# Patient Record
Sex: MALE | Race: WHITE
[De-identification: names, ages, dates, MRNs, and addresses within clinical notes are randomized per-mention and may not be internally consistent; named-entity substitution may affect disease eponyms.]

---

## 2019-01-29 ENCOUNTER — HOSPITAL ENCOUNTER (INPATIENT)
Dept: HOSPITAL 1 - ED | Age: 54
LOS: 2 days | Discharge: HOME HEALTH | DRG: 137 | End: 2019-01-31
Attending: INTERNAL MEDICINE | Admitting: INTERNAL MEDICINE
Payer: COMMERCIAL

## 2019-01-29 VITALS — SYSTOLIC BLOOD PRESSURE: 172 MMHG | DIASTOLIC BLOOD PRESSURE: 86 MMHG

## 2019-01-29 VITALS — SYSTOLIC BLOOD PRESSURE: 160 MMHG | DIASTOLIC BLOOD PRESSURE: 93 MMHG

## 2019-01-29 VITALS
BODY MASS INDEX: 26.55 KG/M2 | WEIGHT: 169.13 LBS | HEIGHT: 67 IN | WEIGHT: 169.13 LBS | BODY MASS INDEX: 26.55 KG/M2 | HEIGHT: 67 IN

## 2019-01-29 VITALS — DIASTOLIC BLOOD PRESSURE: 86 MMHG | SYSTOLIC BLOOD PRESSURE: 166 MMHG

## 2019-01-29 VITALS — SYSTOLIC BLOOD PRESSURE: 168 MMHG | DIASTOLIC BLOOD PRESSURE: 66 MMHG

## 2019-01-29 DIAGNOSIS — Z93.1: ICD-10-CM

## 2019-01-29 DIAGNOSIS — J96.01: ICD-10-CM

## 2019-01-29 DIAGNOSIS — I69.354: ICD-10-CM

## 2019-01-29 DIAGNOSIS — Z74.01: ICD-10-CM

## 2019-01-29 DIAGNOSIS — Z66: ICD-10-CM

## 2019-01-29 DIAGNOSIS — J45.909: ICD-10-CM

## 2019-01-29 DIAGNOSIS — N18.3: ICD-10-CM

## 2019-01-29 DIAGNOSIS — D64.9: ICD-10-CM

## 2019-01-29 DIAGNOSIS — Z79.84: ICD-10-CM

## 2019-01-29 DIAGNOSIS — Z83.3: ICD-10-CM

## 2019-01-29 DIAGNOSIS — Z23: ICD-10-CM

## 2019-01-29 DIAGNOSIS — E11.22: ICD-10-CM

## 2019-01-29 DIAGNOSIS — I12.9: ICD-10-CM

## 2019-01-29 DIAGNOSIS — Z90.49: ICD-10-CM

## 2019-01-29 DIAGNOSIS — E78.00: ICD-10-CM

## 2019-01-29 DIAGNOSIS — J69.0: Primary | ICD-10-CM

## 2019-01-29 LAB
ALBUMIN SERPL-MCNC: 2 G/DL (ref 3.4–5)
ALP SERPL-CCNC: 200 U/L (ref 46–116)
ALT SERPL-CCNC: 16 U/L (ref 16–63)
AMYLASE SERPL-CCNC: 57 U/L (ref 25–115)
AST SERPL-CCNC: 20 U/L (ref 15–37)
BASOPHILS NFR BLD: 0.4 % (ref 0–2)
BILIRUB SERPL-MCNC: 0.1 MG/DL (ref 0.2–1)
BUN SERPL-MCNC: 28 MG/DL (ref 7–18)
CALCIUM SERPL-MCNC: 8.4 MG/DL (ref 8.5–10.1)
CHLORIDE SERPL-SCNC: 108 MMOL/L (ref 98–107)
CHOLEST SERPL-MCNC: 251 MG/DL (ref ?–200)
CO2 SERPL-SCNC: 30.6 MMOL/L (ref 21–32)
CREAT SERPL-MCNC: 1.7 MG/DL (ref 0.7–1.3)
ERYTHROCYTE [DISTWIDTH] IN BLOOD BY AUTOMATED COUNT: 13 % (ref 11.5–14.5)
GFR SERPLBLD BASED ON 1.73 SQ M-ARVRAT: 45 ML/MIN
GLUCOSE SERPL-MCNC: 82 MG/DL (ref 74–106)
HDLC SERPL-MCNC: 60 MG/DL (ref 40–60)
LIPASE SERPL-CCNC: 58 IU/L (ref 73–393)
MICROSCOPIC UR-IMP: YES
PLATELET # BLD: 279 X10^3MCL (ref 130–400)
POTASSIUM SERPL-SCNC: 4.2 MMOL/L (ref 3.5–5.1)
PROT SERPL-MCNC: 6 G/DL (ref 6.4–8.2)
RBC # UR STRIP.AUTO: (no result) /UL
SODIUM SERPL-SCNC: 141 MMOL/L (ref 136–145)
T4 SERPL-MCNC: 4.7 UG/DL (ref 4.7–13.3)
UA SPECIFIC GRAVITY: 1.02 (ref 1–1.03)

## 2019-01-30 VITALS — DIASTOLIC BLOOD PRESSURE: 82 MMHG | SYSTOLIC BLOOD PRESSURE: 130 MMHG

## 2019-01-30 VITALS — DIASTOLIC BLOOD PRESSURE: 83 MMHG | SYSTOLIC BLOOD PRESSURE: 145 MMHG

## 2019-01-30 VITALS — DIASTOLIC BLOOD PRESSURE: 81 MMHG | SYSTOLIC BLOOD PRESSURE: 145 MMHG

## 2019-01-30 VITALS — DIASTOLIC BLOOD PRESSURE: 70 MMHG | SYSTOLIC BLOOD PRESSURE: 150 MMHG

## 2019-01-30 VITALS — SYSTOLIC BLOOD PRESSURE: 144 MMHG | DIASTOLIC BLOOD PRESSURE: 90 MMHG

## 2019-01-30 VITALS — DIASTOLIC BLOOD PRESSURE: 87 MMHG | SYSTOLIC BLOOD PRESSURE: 148 MMHG

## 2019-01-30 LAB
BASOPHILS NFR BLD: 0.5 % (ref 0–2)
BUN SERPL-MCNC: 25 MG/DL (ref 7–18)
CALCIUM SERPL-MCNC: 7.9 MG/DL (ref 8.5–10.1)
CHLORIDE SERPL-SCNC: 109 MMOL/L (ref 98–107)
CO2 SERPL-SCNC: 26.9 MMOL/L (ref 21–32)
CREAT SERPL-MCNC: 1.7 MG/DL (ref 0.7–1.3)
ERYTHROCYTE [DISTWIDTH] IN BLOOD BY AUTOMATED COUNT: 13.8 % (ref 11.5–14.5)
GFR SERPLBLD BASED ON 1.73 SQ M-ARVRAT: 45 ML/MIN
GLUCOSE SERPL-MCNC: 72 MG/DL (ref 74–106)
PLATELET # BLD: 230 X10^3MCL (ref 130–400)
POTASSIUM SERPL-SCNC: 3.9 MMOL/L (ref 3.5–5.1)
SODIUM SERPL-SCNC: 141 MMOL/L (ref 136–145)

## 2019-01-31 VITALS — DIASTOLIC BLOOD PRESSURE: 89 MMHG | SYSTOLIC BLOOD PRESSURE: 147 MMHG

## 2019-01-31 VITALS — DIASTOLIC BLOOD PRESSURE: 82 MMHG | SYSTOLIC BLOOD PRESSURE: 154 MMHG

## 2019-01-31 VITALS — SYSTOLIC BLOOD PRESSURE: 148 MMHG | DIASTOLIC BLOOD PRESSURE: 81 MMHG

## 2019-01-31 VITALS — DIASTOLIC BLOOD PRESSURE: 94 MMHG | SYSTOLIC BLOOD PRESSURE: 150 MMHG

## 2019-07-30 ENCOUNTER — HOSPITAL ENCOUNTER (OUTPATIENT)
Dept: HOSPITAL 1 - DS | Age: 54
Discharge: HOME | End: 2019-07-30
Attending: INTERNAL MEDICINE
Payer: COMMERCIAL

## 2019-07-30 VITALS — SYSTOLIC BLOOD PRESSURE: 171 MMHG | DIASTOLIC BLOOD PRESSURE: 103 MMHG

## 2019-07-30 VITALS — WEIGHT: 149.98 LBS | BODY MASS INDEX: 24.99 KG/M2 | HEIGHT: 65 IN

## 2019-07-30 VITALS — SYSTOLIC BLOOD PRESSURE: 185 MMHG | DIASTOLIC BLOOD PRESSURE: 106 MMHG

## 2019-07-30 DIAGNOSIS — F01.50: ICD-10-CM

## 2019-07-30 DIAGNOSIS — Z90.49: ICD-10-CM

## 2019-07-30 DIAGNOSIS — Z99.3: ICD-10-CM

## 2019-07-30 DIAGNOSIS — I10: ICD-10-CM

## 2019-07-30 DIAGNOSIS — I69.354: ICD-10-CM

## 2019-07-30 DIAGNOSIS — R13.10: ICD-10-CM

## 2019-07-30 DIAGNOSIS — E11.9: ICD-10-CM

## 2019-07-30 DIAGNOSIS — I69.391: ICD-10-CM

## 2019-07-30 DIAGNOSIS — K94.23: Primary | ICD-10-CM

## 2019-07-30 PROCEDURE — 0D20XUZ CHANGE FEEDING DEVICE IN UPPER INTESTINAL TRACT, EXTERNAL APPROACH: ICD-10-PCS | Performed by: INTERNAL MEDICINE

## 2020-07-11 ENCOUNTER — HOSPITAL ENCOUNTER (INPATIENT)
Dept: HOSPITAL 1 - ED | Age: 55
LOS: 10 days | DRG: 853 | End: 2020-07-21
Attending: FAMILY MEDICINE | Admitting: FAMILY MEDICINE
Payer: COMMERCIAL

## 2020-07-11 VITALS
WEIGHT: 132.28 LBS | HEIGHT: 67 IN | HEIGHT: 67 IN | BODY MASS INDEX: 20.76 KG/M2 | WEIGHT: 132.28 LBS | BODY MASS INDEX: 20.76 KG/M2

## 2020-07-11 DIAGNOSIS — N39.0: ICD-10-CM

## 2020-07-11 DIAGNOSIS — I46.9: ICD-10-CM

## 2020-07-11 DIAGNOSIS — L89.223: ICD-10-CM

## 2020-07-11 DIAGNOSIS — Z90.49: ICD-10-CM

## 2020-07-11 DIAGNOSIS — Z74.01: ICD-10-CM

## 2020-07-11 DIAGNOSIS — Z79.899: ICD-10-CM

## 2020-07-11 DIAGNOSIS — N18.6: ICD-10-CM

## 2020-07-11 DIAGNOSIS — L89.154: ICD-10-CM

## 2020-07-11 DIAGNOSIS — A41.9: Primary | ICD-10-CM

## 2020-07-11 DIAGNOSIS — J12.89: ICD-10-CM

## 2020-07-11 DIAGNOSIS — Z99.2: ICD-10-CM

## 2020-07-11 DIAGNOSIS — J96.01: ICD-10-CM

## 2020-07-11 DIAGNOSIS — E11.65: ICD-10-CM

## 2020-07-11 DIAGNOSIS — E87.6: ICD-10-CM

## 2020-07-11 DIAGNOSIS — D63.1: ICD-10-CM

## 2020-07-11 DIAGNOSIS — Z86.73: ICD-10-CM

## 2020-07-11 DIAGNOSIS — U07.1: ICD-10-CM

## 2020-07-11 DIAGNOSIS — G81.90: ICD-10-CM

## 2020-07-11 DIAGNOSIS — E87.1: ICD-10-CM

## 2020-07-11 DIAGNOSIS — E43: ICD-10-CM

## 2020-07-11 DIAGNOSIS — Z83.3: ICD-10-CM

## 2020-07-11 DIAGNOSIS — R13.10: ICD-10-CM

## 2020-07-11 DIAGNOSIS — E11.22: ICD-10-CM

## 2020-07-11 LAB
ALBUMIN SERPL-MCNC: 1.1 G/DL (ref 3.4–5)
ALP SERPL-CCNC: 505 U/L (ref 46–116)
ALT SERPL-CCNC: 19 U/L (ref 16–63)
AST SERPL-CCNC: 30 U/L (ref 15–37)
BILIRUB SERPL-MCNC: 0.4 MG/DL (ref 0.2–1)
BUN SERPL-MCNC: 49 MG/DL (ref 7–18)
CALCIUM SERPL-MCNC: 7.6 MG/DL (ref 8.5–10.1)
CHLORIDE SERPL-SCNC: 93 MMOL/L (ref 98–107)
CO2 SERPL-SCNC: 25.1 MMOL/L (ref 21–32)
CREAT SERPL-MCNC: 2.1 MG/DL (ref 0.7–1.3)
ERYTHROCYTE [DISTWIDTH] IN BLOOD BY AUTOMATED COUNT: 14.9 % (ref 11.5–14.5)
GFR SERPLBLD BASED ON 1.73 SQ M-ARVRAT: 35 ML/MIN
GLUCOSE SERPL-MCNC: 180 MG/DL (ref 74–106)
MICROSCOPIC UR-IMP: YES
MONOCYTES NFR BLD: 2 % (ref 0–7)
NEUTS BAND NFR BLD: 15 % (ref 0–10)
NEUTS SEG NFR BLD MANUAL: 81 % (ref 37–75)
PLAT MORPH BLD: (no result)
PLATELET # BLD: 301 X10^3MCL (ref 130–400)
POTASSIUM SERPL-SCNC: 3.3 MMOL/L (ref 3.5–5.1)
PROT SERPL-MCNC: 5.5 G/DL (ref 6.4–8.2)
RBC # UR STRIP.AUTO: (no result) /UL
RBC MORPH BLD: (no result)
SODIUM SERPL-SCNC: 127 MMOL/L (ref 136–145)
UA SPECIFIC GRAVITY: 1.01 (ref 1–1.03)

## 2020-07-11 PROCEDURE — P9016 RBC LEUKOCYTES REDUCED: HCPCS

## 2020-07-11 PROCEDURE — C9113 INJ PANTOPRAZOLE SODIUM, VIA: HCPCS

## 2020-07-11 PROCEDURE — G0378 HOSPITAL OBSERVATION PER HR: HCPCS

## 2020-07-11 NOTE — NUR
TURNED AND REPOSITIONED PT TO LEFT SIDE. PRESSURE REDISTRIBUTED PILLOW TO
SCARAL, B/L ELBOWS AND B/L HEALS. PT LOOKS TO BE IN NO ACUTE DISTRESS AT THIS
TIME. WILL CONTINUE TO MONITOR.

## 2020-07-11 NOTE — NUR
PT REPOSITIONED FOR COMFORT AND TO PREVENT PRESSURE ULCERS. PT MOVED TO LEFT
SIDE. PILLOWS PLACED UNDER FELECIA PROMINENCES. PT TOLERATING BIPAP WELL. CHRISTIANO
CHEST RISE AND FALL NOTED. COMFORT MEASURES IN PLACE. WILL CONTINUE TO MONITOR

## 2020-07-11 NOTE — NUR
PT LAYING ON RT SIDE IN ED GURNEY THIS TIME. PT HAS PILLOWS UNDER BONY
PROMINENCES. PT IS ABLE TO OPEN HIS EYES BUT DOES NOT SPEAK OR FOLLOW COMMANDS.
PT IS ON BIPAP AT THIS TIME. PT APPEARS TO BE TOLERATING BIPAP WELL, NO
GRIMACING NOTED. CHRISTIANO CHEST RISE AND FALL NOTED. NO MEDICATIONS ARE RUNNING AT
THIS TIME, AWAITING ADMITTING ORDERS.

## 2020-07-11 NOTE — NUR
PT REPOSITIONED FOR COMFORT. PT MAKES MOANING NOISES WHILE REPOSITIONING. PT IS
ALERT BUT DOES NOT RESPOND OR FOLLOW COMMANDS. PT TOLERATING BIPAP WELL. RESPS
ARE E/U. CHRISTIANO CHEST RISE AND NOTED. WILL CONTINUE TO MONITOR

## 2020-07-11 NOTE — NUR
PT NOTED RESTING ON RIGHT SIDE IN ER NEELAM PAZ AT THIS TIME, NO AD NOTED.
SIDE RAILS UP X 2 FOR SAFETY. WILL CONTINUE TO MONITOR.

## 2020-07-11 NOTE — NUR
PER [facility], IT WAS REPORTED THAT PT WAS SWABBED FOR COVID AND HAS A
NEGATIVE RESULT, DATE AND TIME OF SWAB UNKNOWN, STATES SOMETIME LAST WEEK,

## 2020-07-11 NOTE — NUR
CENTRAL LINE SUCCESSFUL, 7 FR, 3 LUMEN, 20 CM CATHETER, TO THE LEFT SUBCLAVIAN,
VERBAL ORDER FOR X RAY, CK SAMUEL CONTACTED X RAY STAT,

## 2020-07-11 NOTE — NUR
DR LAWLER AT BEDSIDE WITH ULTRASOUND GUIDED IV INSERTION, UNSUCCESSFUL,
REQUESTING FOR CENTRAL LINE SET UP, ORDERS CARRIED OUT

## 2020-07-11 NOTE — NUR
TURNED AND REPOSITIONED PT WITH PILLOW. TURNED PT TO LEFT SIDE WITH PILLOW. B/
HEALS AND ELBOWS REDISTRIBUTED WITH PILLOW. MADE PT COMFORTABLE IN BED. PT
LOOKS TO BE IN NO ACUTE DISTRESS AT THIS TIME. WILL CONTINUE TO MONITOR.

## 2020-07-11 NOTE — NUR
WIFE AT BEDSIDE, WIFE SIGNED POLST FORM. WIFE STATED PT WAS DOING GOOD LAST
NIGHT THEN AFTER 0700 TODAY STARTED LABOR BREATHING AND O2 WENT DOWN TO 35%.
WIFE STATES PT HAS BEEN VERY FLEMMY AND COUGHING OVER PAST 4 DAYS. 
AT BASELINE PT IS UNABLE TO SPEAK D/T PAST CVA AND IS NORMALLY UNABLE TO FOLLOW
COMMANDS. PER WIFE, PT HAS A FISTULA TO RIGHT FOREARM THAT WAS PUT IN IN MARCH
BUT RIGHT SUBCLAVIAN IS WHAT IS CURRENLTY BEING USED FOR DIALYSIS. LAST
DIALYSIS WAS YESTERDAY. WILL CONTINUE TO MONITOR.

## 2020-07-11 NOTE — NUR
PT BIBA/FIRE C/O SOB, PER [facility] FAMILY CONTACTED [facility], PT WITH RESPIRATORY
DISTRESS, PER FAMILY PT WAS BREATHING FAST, ACCORDING TO [facility], PT WAS ADMITTED
TO [facility] 8 DAYS AGO WITH A DIAGNOSES OF PNEUMONIA, RALES NOTED UPON
AUSCULATION, RR 35 ON SCENE WITH A BS OF 46, PT 02 SAT AT 35%-42% RA, PLACED ON
HIGH OXYGEN FLOW, 02 SAT INCREASED TO 94%, GLUCAGON WAS ADMINISTERED ON SCENCE,
PT HAS A HX OF [facility], DM, BED-BOUND, WIFE TO COME WITH MEDICATION LIST, PT ALSO
NOTED TO BE RECEIVING DIALYSIS, UKNOWN OF WHAT DAYS AT THIS TIME, DIALYSIS
SHUNT NOTED ON RIGHT UPPER CHEST, G TUBE NOTED ON MID ABDONINAL REGION, UPON
ARRIVAL, PT PLACED IN T1, RT AT BEDSIDE WITH BIPAP, IV ACESS UNOBTAINABLE, 
MADE AWARE, BS UPON ARRIVAL 39,  MADE AWARE, CURRENT RR 32, 02 % ON
BIPAP, SAFETY PRECAUTIONS IN PLACE, WILL MONITOR,

## 2020-07-11 NOTE — NUR
ALL WOUNDS DOCUMENTED AND PICTURES TAKEN. 
PRESSURE ULCER NOTED TO B/L HIPS AND SACRAL. SCAB NOTED TO MIDDLE BACK AND
RIGHT HEAL. OPTIFOAM APPLIED TO LEFT HIP, SACRAL AND B/L HEALS. PRESSURE
REDISTRIBUTED TO RIGHT SIDE, LEFT ARM AND B/L HEALS WITH PILLOW. DR. LAWLER
MADE AWARE OF WOUND. FOUL SMELL NOTED TO RIGHT HIP PRESSURE ULCER.

## 2020-07-11 NOTE — NUR
TURNED AND REPOSITIONED PT, PT TURNED TO LEFT SIDE. POSITIONED WITH PILLOW AND
REDISTRIBUTED WITH PILLOW UNDER B/L HEALS AND ELBOW. PT LOOKS TO BE IN NO ACUTE
DISTRESS AT THIS TIME. WILL CONTINUE TO MONITOR.

## 2020-07-11 NOTE — NUR
PER WIFE, NO CHEST COMPRESSION OR INTUBATION. WILL HAVE WIFE SIGN FORM. WIFE
OUTSIDE AT THIS TIME WAITING TO SEE PT. DR. LAWLER AWARE OF WIFE'S WISHES

## 2020-07-11 NOTE — NUR
SPOKE WITH DR KIM ABOUT PTS BP MEDICATION. INFORMED THE DR THAT THE PTS
BP IS WITHIN NORMAL LIMITS AND HAS BEEN TRENDING LOW. WE BOTH AGREED TO HOLD
THE MEDICATIONS AT THIS TIME

## 2020-07-12 VITALS — DIASTOLIC BLOOD PRESSURE: 70 MMHG | SYSTOLIC BLOOD PRESSURE: 120 MMHG

## 2020-07-12 VITALS — SYSTOLIC BLOOD PRESSURE: 117 MMHG | DIASTOLIC BLOOD PRESSURE: 69 MMHG

## 2020-07-12 VITALS — SYSTOLIC BLOOD PRESSURE: 104 MMHG | DIASTOLIC BLOOD PRESSURE: 64 MMHG

## 2020-07-12 LAB
BUN SERPL-MCNC: 59 MG/DL (ref 7–18)
CALCIUM SERPL-MCNC: 8.2 MG/DL (ref 8.5–10.1)
CHLORIDE SERPL-SCNC: 95 MMOL/L (ref 98–107)
CO2 SERPL-SCNC: 23.4 MMOL/L (ref 21–32)
CREAT SERPL-MCNC: 2.5 MG/DL (ref 0.7–1.3)
ERYTHROCYTE [DISTWIDTH] IN BLOOD BY AUTOMATED COUNT: 15.4 % (ref 11.5–14.5)
GFR SERPLBLD BASED ON 1.73 SQ M-ARVRAT: 29 ML/MIN
GLUCOSE SERPL-MCNC: 95 MG/DL (ref 74–106)
MAGNESIUM SERPL-MCNC: 2.1 MG/DL (ref 1.8–2.4)
MONOCYTES NFR BLD: 2 % (ref 0–7)
NEUTS BAND NFR BLD: 13 % (ref 0–10)
NEUTS SEG NFR BLD MANUAL: 81 % (ref 37–75)
PHOSPHATE SERPL-MCNC: 3.2 MG/DL (ref 2.5–4.9)
PLAT MORPH BLD: (no result)
PLATELET # BLD: 352 X10^3MCL (ref 130–400)
POTASSIUM SERPL-SCNC: 3.6 MMOL/L (ref 3.5–5.1)
RBC MORPH BLD: (no result)
SODIUM SERPL-SCNC: 130 MMOL/L (ref 136–145)

## 2020-07-12 NOTE — NUR
RECEIVED PT FROM ER VIA GURNEY ACCOMPANIED BY RN AND EMT. PT WAS TRANSFERRED
TO ICU BED 8 WITH NO COMPLICATIONS. PT CONNECTED TO FULL CARDIAC
MONITOR/CONTINOUS PULSE OXIMETRY, VITALS READING: NIBP 117/69 MAP 95, HR 77,
RR 14, SPO2 100%, RECTAL TEMP 94.5. PT IS NONVERBAL AT BASELINE. UNABLE TO
FOLLOW SIMPLE COMMANDS/MAKE NEEDS KNONW. OPENS EYES TO TACTILE STIMULI,
UNABLE TO TRACK. LEFT SUBCLAVIAL CVC, TLC, INTACT/SECURED, DRESSING CDI. PT'S
BREATHING IS E/U ON 15 LPM VIA NONREBREATHER. LUNGS SOUND DIMIN BILAT.
SYMMETRICAL CHEST EXPANSION NOTED. NO S/S OF RESP DISTRESS NOTED. SKIN IS COOL
AND DRY. +4 EDEMA NOTED TO RUE. PT BEDBOUND, TOTAL CARE. ABD IS SOFT/FLAT,
NONTENDER TO PALPATION. BOWEL SOUNDS ACTIVE X4 QUADRANTS. PT HAD A SMALL SOFT
BROWN BM, PERICARE PROVIDED. PEG NOTED TO LUQ. F/C INTACT/SECURED, DRAINING
VIA GRAVITY WITH BROWN CLOUDY COLORED URINE. RIGHT UPPER CHEST DIALYSIS PORT,
DRESSING CDI. PRESSURE ULCER NOTED TO SACRAL AREA, RIGHT HIP, AND LEFT HIP,
OPTIFOAM PLACED. DTI NOTED TO BILAT HEELS, OPTIFOAM IN PLACE, FLEX BOOTS IN
PLACE, ISOFLEX MATTRESS IN PLACE. EXTREMITIES OFFLOADED FOR PRESSURE RELIEF.
BED IN LOW POSITION. CALL LIGHT IN REACH.

## 2020-07-12 NOTE — NUR
RAÚL MCLAUGHLIN AND MYSELF REPOSITIONED THE PT ONTO THE PT LEFT SIDE. PT HAS PILLOWS
UNDER FELECIA PROMINENCES. CHRISTIANO CHEST RISE AND FALL NOTED. WILL CONTINUE TO
MONITOR

## 2020-07-12 NOTE — NUR
PT TAKEN TO ICU BED 8 VIA ED [place]. PT ESCORTED BY MYSELF AND SHON EMT. NO
INCIDENCE NOTED DURING TRANSPOT

## 2020-07-12 NOTE — NUR
REPOSITIONED PT TO MAINTAIN COMFORT. CHRISTIANO CHEST RISE AND FLL NOTED. PT EYES OPEN
WITH VERBAL STIMULI, BUT PT DOES NOT RESPOND. WILL CONTINUE TO MONITOR

## 2020-07-12 NOTE — NUR
PATIENT REPOSITIONED TO A POSITION OF COMFORT. PILLOWS PLACED UNDER BONY
PROMINENCES AND HEELS LIFTED TO AVOID ADDITIONAL PRESSURE ULCERS. VSS, NAD
NOTED, WILL CONTINUE TO MONITOR.

## 2020-07-12 NOTE — NUR
SPOKE WITH MRS. ARMENDARIZ, SHE ADVISED THAT THE RIGHT ARM SWELLING IS DUE TO THE
FISTULA PLACED IN MARCH. ACCORDING TO THE WIFE, PATIENTS PCP STATES THAT THE
FISTULA IS NOT TO BE USED DUE TO HIM BEING EXTREMELY WEAK. HE GOES TO DIALYS[facility]
[place] [facility] [place]. PROVIDED AND UPDATE TO WIFE AND SHE WAS APPRECIATIVE OF
THE INFORMATION.

## 2020-07-12 NOTE — NUR
SPOKE WITH PTS WIFE AND INFORMED HER ABOUT PT MOVING TO ICU AND CARE UPDATE. PT
WIFE VERBALIZED UNDERSTANDING

## 2020-07-12 NOTE — NUR
PT IS RESTING IN ED GURNEY STILL CONTINURING ON BIPAP. PT IS TOLERATING BIPAP
WELL. BEIL CHEST RISE AND FALL NOTED. PT IS ALERT WHEN STIMULATED WITH TOUCH
AND VOICE BUT DOES NOT RESPOND.

## 2020-07-12 NOTE — NUR
CALLED RESIDENT ON CALL, SPOKE TO DR CHOU, TO CLARIFY ZOSYN 4.5GM ORDER.
REPORTS THAT SHE WILL CALL BACK TO CLARIFY ORDER.

## 2020-07-13 VITALS — DIASTOLIC BLOOD PRESSURE: 69 MMHG | SYSTOLIC BLOOD PRESSURE: 126 MMHG

## 2020-07-13 VITALS — SYSTOLIC BLOOD PRESSURE: 119 MMHG | DIASTOLIC BLOOD PRESSURE: 71 MMHG

## 2020-07-13 VITALS — DIASTOLIC BLOOD PRESSURE: 80 MMHG | SYSTOLIC BLOOD PRESSURE: 139 MMHG

## 2020-07-13 VITALS — DIASTOLIC BLOOD PRESSURE: 69 MMHG | SYSTOLIC BLOOD PRESSURE: 117 MMHG

## 2020-07-13 VITALS — SYSTOLIC BLOOD PRESSURE: 129 MMHG | DIASTOLIC BLOOD PRESSURE: 74 MMHG

## 2020-07-13 LAB
ALBUMIN SERPL-MCNC: 1.1 G/DL (ref 3.4–5)
BASOPHILS NFR BLD: 0.1 % (ref 0–2)
BUN SERPL-MCNC: 68 MG/DL (ref 7–18)
CALCIUM SERPL-MCNC: 8 MG/DL (ref 8.5–10.1)
CHLORIDE SERPL-SCNC: 95 MMOL/L (ref 98–107)
CO2 SERPL-SCNC: 22.3 MMOL/L (ref 21–32)
CREAT SERPL-MCNC: 2.7 MG/DL (ref 0.7–1.3)
CRP SERPL-MCNC: 13.4 MG/DL (ref ?–0.9)
ERYTHROCYTE [DISTWIDTH] IN BLOOD BY AUTOMATED COUNT: 15.3 % (ref 11.5–14.5)
GFR SERPLBLD BASED ON 1.73 SQ M-ARVRAT: 26 ML/MIN
GLUCOSE SERPL-MCNC: 142 MG/DL (ref 74–106)
MAGNESIUM SERPL-MCNC: 2.1 MG/DL (ref 1.8–2.4)
PHOSPHATE SERPL-MCNC: 3.7 MG/DL (ref 2.5–4.9)
PLATELET # BLD: 332 X10^3MCL (ref 130–400)
POTASSIUM SERPL-SCNC: 3.6 MMOL/L (ref 3.5–5.1)
SODIUM SERPL-SCNC: 129 MMOL/L (ref 136–145)

## 2020-07-13 NOTE — NUR
REPORT GIVEN TO PROSPER MCLAUGHLIN FOR CONTINUITY OF CARE. ALL QUESTIONS/CONCERNS
ADDRESSED. ENDORSING ALL CARE

## 2020-07-13 NOTE — NUR
UNABLE TO CONNECT TUBE FEEDING TO PEG TUBE WITHOUT ADAPTOR. PT BOLUSED WITH 60
ML OF NEPRO TUBE FEEDING FOLLOWED BY 60 CC OF WATER.

## 2020-07-13 NOTE — NUR
RECEIVED PT RESPONSIVE TO VERBAL STIMULI, UNABLE TO FOLLOW COMMANDS. PUPILS
3MM SLUGGISH. NONVERBAL AT BASELINE. R SLUCLAVIA HUBERT AND L SUBCLAVIA CVC
WNL, DRESSINGS CDI. NO REDNESS, DRAINAGE, OR SWELLING NOTED TO EENT. ON 2L VIA
NC, O2 SAT 98%. LUNG SOUNDS HAVE CRACKLES BILATERALLY. ON 2L VIA NC, O2 SAT
98%. LUNG SOUNDS HAVE CRACKLES BILATERALLY. S1S2 AUSCULTATED. NO S/SX OF CHEST
PAIN. CAP REFILL <3 SEC. +3 PTTING EDEMA TO RUE. TRACE TO ALL OTHER
EXTREMITIES. PULSES PALPABLE. GENERALIZED WEAKNESS. BS HYPOACTIVE X 4. NO N/V.
NO BM AT THIS TIME. GTUBE IN PLACE. JORGE CATHETER IN PLACE DRAINING VIA
GRAVITY WITH NO URINE OUTPUT NOTED. OPTIFOAM TO BOTH HIPS AND SACRAL AREA.
BOLSTER BOOTS TO LOWER EXTREMITIES. BED IN LOW POSITION. CALL LIGHT WITHIN
REACH.

## 2020-07-13 NOTE — NUR
PT IS SLEEPING, EASILY AROUSABLE. NO S/S OF ACUTE DISTRESS NOTED.
NONREBREATHER TITRATED TO 10 LPM. TOLERATING WELL. SPO2 99%

## 2020-07-13 NOTE — NUR
LATE ENTRY MD SANTILLAN & RESIDENT TEAM AT BEDSIDE, NO NEW ORDERS AT THIS TIME,
AWAITING TRANSFER TO [facility]

## 2020-07-14 VITALS — SYSTOLIC BLOOD PRESSURE: 145 MMHG | DIASTOLIC BLOOD PRESSURE: 82 MMHG

## 2020-07-14 VITALS — SYSTOLIC BLOOD PRESSURE: 99 MMHG | DIASTOLIC BLOOD PRESSURE: 60 MMHG

## 2020-07-14 VITALS — DIASTOLIC BLOOD PRESSURE: 82 MMHG | SYSTOLIC BLOOD PRESSURE: 133 MMHG

## 2020-07-14 VITALS — SYSTOLIC BLOOD PRESSURE: 128 MMHG | DIASTOLIC BLOOD PRESSURE: 74 MMHG

## 2020-07-14 LAB
BASOPHILS NFR BLD: 0 % (ref 0–2)
BUN SERPL-MCNC: 77 MG/DL (ref 7–18)
CALCIUM SERPL-MCNC: 7.8 MG/DL (ref 8.5–10.1)
CHLORIDE SERPL-SCNC: 94 MMOL/L (ref 98–107)
CO2 SERPL-SCNC: 25.2 MMOL/L (ref 21–32)
CREAT SERPL-MCNC: 3.1 MG/DL (ref 0.7–1.3)
CRP SERPL-MCNC: 12.4 MG/DL (ref ?–0.9)
ERYTHROCYTE [DISTWIDTH] IN BLOOD BY AUTOMATED COUNT: 15.1 % (ref 11.5–14.5)
GFR SERPLBLD BASED ON 1.73 SQ M-ARVRAT: 22 ML/MIN
GLUCOSE SERPL-MCNC: 140 MG/DL (ref 74–106)
MAGNESIUM SERPL-MCNC: 2.1 MG/DL (ref 1.8–2.4)
PHOSPHATE SERPL-MCNC: 3.6 MG/DL (ref 2.5–4.9)
PLATELET # BLD: 310 X10^3MCL (ref 130–400)
POTASSIUM SERPL-SCNC: 3.7 MMOL/L (ref 3.5–5.1)
SODIUM SERPL-SCNC: 130 MMOL/L (ref 136–145)

## 2020-07-14 PROCEDURE — 5A1D70Z PERFORMANCE OF URINARY FILTRATION, INTERMITTENT, LESS THAN 6 HOURS PER DAY: ICD-10-PCS | Performed by: SURGERY

## 2020-07-14 NOTE — NUR
REC'D PT FROM DAY NURSE. PT RESTING IN BED. NONVERBAL. OPENS EYES TO PAINFUL
STIMULI. DOES NOT FOLLOW COMMANDS. UNABLE TO ASSESS ORIENTATION. DROOLING WITH
GREEN MUCUS ON R SIDE OF MOUTH. ORAL CARE PROVIDED. BREATHING EVEN/UNLABORED
ON 2L NC, SPO2 98%. TELE 30. NO S/SX OF CP. ELIZABETH PITTING EDEMA. HD IN PROGRESS
WITH R UPPER CHEST HUBERT CATH. L UPPER CHEST CENTRAL LINE, 3 PORTS FLUSHED
AND PATENT AND BLOOD RETURN. DRESSINGS CDI. ABD SOFT/FLAT. NO GRIMACE UPON
PALPATION. RUQ PEG INFUSING NEPRO @ 40 ML/HR WITH 60 ML FWF Q6. NO RESIDUAL.
JORGE WITH MINIMAL OUTPUT DRAINING TO GRAVITY. GEN WEAKNESS. PASSIVE ROM.
STIFF BUE. DRESSINGS TO CHRISTIANO HIPS AND SACRUM CDI. CALL LIGHT WITHIN REACH, BED
AT LOWEST POSITION, BED ALARM ON. WILL CONTINUE TO MONITOR.

## 2020-07-14 NOTE — NUR
WOUND CARE EVALUATION NOTE:
REASON FOR EVALUATION: PRESSURE ULCER WOUNDS
SKIN ASSESSMENT DONE WITH THIS 55 Y/O MALE PT ADMITTED TO [facility] WITH INITIAL DX
OF SOB . PT IS COVID POSITIVE. PAST MEDICAL HX INCLUDES CVA , HTN, ESRD AND
CHRONIC MULTIPLE PRESSURE ULCERS. ABOVE INFORMATION OBTAINED FROM ADMISSION
H&P. SKIN IS WARM AND DRY, RIGHT UPPER EXTRMITY +2 EDEMA, BLE NO HAIR GROWTH,
NO EDEMA.  DORSAL PEDAL PULSES PRESENT AND NORMAL. INCONTINENT OF BOWEL,POC
DISCUSSED WITH DR. SANTILLAN /DR. VARGAS AND PRIMARY RN.
RECOMMEND SURGEON CONSULT FOR WOUND DEBRIDEMENT. PER DR. SANTILLAN/DR. VARGAS
YESTERDAY FAMILY WISH TO HAVE COMFORT CARE, DOCTORS WILL DISCUSS WITH FAMILY.
 
INTEGUMENTARY:
- SACRALCOCCYX PRESSURE ULCER INJURY STAGE 4 7X5X1 CM WOUND BED PALE PINK
SCATTERED YELLOW SLOUGH, SMALL AMOUNT SEROSANGANOUS DRAINAGE, FILIBERTO-WOUND SKIN
MOIST INTACT SURROUNDING REDNESS EXTENDED TO R/L BUTTOCKS INDICATED FURTHER
DAMAGE
-RIGHT TROCHANTER PRESSURE ULCER INJURY UN-STAGEABLE 9X5X1.3CM, WOUND BED 40 %
SOFT YELLOW SLOUGH, MILD ODOR, SMALL AMOUNT PURULENT DRAINAGE, FILIBERTO-WOUND SKIN
MOIST INTACT SURROUNDING REDNESS INDICATED FURTHER DAMAGE
- RIGHT HEEL PRESSURE ULCER INJURY  100% BLACK ESCHAR 2X3CM, FILIBERTO-WOUND SKIN
MUSHY AND SURROUNDING REDNESS INDICATED FURTHER DAMAGE
- RIGHT MEDIAL HEEL PRESSURE ULCER INJURY DTI 1.5X1.5CM 100% MAROON COLOR,
FILIBERTO-WOUND SKIN MUSHY AND SURROUNDING REDNESS INDICATED FURTHER DAMAGE
-LEFT TROCHANTER PRESSURE ULCER INJURY UN-STAGEABLE 0I6Y1NC, WOUND BED 30 %
SOFT YELLOW SLOUGH, MILD ODOR, SMALL AMOUNT PURULENT DRAINAGE, FILIBERTO-WOUND SKIN
MOIST INTACT SURROUNDING REDNESS INDICATED FURTHER DAMAGE
-LEFT HEEL PRESSURE ULCER INJURY UN-STAGEABLE 0.5X0.5CM BROWN SCAB, FILIBERTO WOUND
SKIN INTACT
-BLANCHABLE REDNESS TO LEFT AND RIGHT ELBOWS
 
RECOMMENDATIONS:
-CLEANSE LEFT HEEL, SACRALCOCCYX, RIGHT AND LEFT TROCHANTERS WOUNDS WITH NS,
PAT DRY PACK WITH SOAKED BETADINE 4X4 AND COVER WITH DRY DRESSING QD AND PRN
IF SOILING
-APPLY SKIN PREP TO RIGHT MEDIAL HEEL BID AND GAGE. APPLY HEEL RAISER TO BOTH
HEELS AT ALL TIMES
-OFFLOAD BILATERAL HEELS BY PLACING PILLOWS UNDER CALVES UNLESS OTHERWISE
CONTRAINDICATED
-PRESSURE REDISTRIBUTION SURFACE THERAPY
-TURN AND REPOSITION Q2H, OFFLOAD SACRALCOCCYX AND BUTTOCKS BY TURNING RIGHT
AND LEFT
-CONTINUE TO FOLLOW RD RECOMMENDATIONS
PLEASE CONTACT WOUND CARE NURSE FOR ANY QUESTION AND CHANGE OF WOUND CONDITION

## 2020-07-14 NOTE — NUR
PATIENT RESTING COMFORTABLY IN BED, NO C/O PAIN OR DISTRESS AT THIS TIME, WILL
ENDORSE CARE TO PM NURSE

## 2020-07-14 NOTE — NUR
ASSESSED PT. NAD NOTED. NO S/SX OF ANY PAIN. BREATHING E/U. PT REMAINS ON 2L
NC. WILL CONTINUE TO MONITOR.

## 2020-07-14 NOTE — NUR
PATIENT TRANSFERED FROM ICU, ARRIVED VIA HOSPITAL BED, PATIENT AWAKE BUT NON
VERBAL AND MINIMALLY REPSONSIVE TO TACTILE STIMULI, LUNG SOUNDS DIMINISHED AT
BASES, PULSE OX 99% ON 2LPM O2 VIA NC, PATIENT UNABLE TO AMBULATE, CENTRAL
LINE IN LSC PRESENT AND PATENT, ON TELEMTRY, SR 73, BOWEL SOUNDS ACTIVE, G
TUNE PRESENT WITH NO RESIDUAL, RUNNING NEPRO @ 40MOL/HR, WOUNDS ON B HIPS AND
SACRUM DRESSINGS INTACT, DTI IN B HEELS, PEDAL PULSES PRESENT, +3 EDEMA ON R
ARM, JORGE CATHETER IN PLACE, 10ML DARK URINE IN BAG, NO S/SX OF DISTRESS
NOTED AT THIS TIME

## 2020-07-14 NOTE — NUR
1. Recommend Nepro at 40ml/hr. At goal rate, it will provide a volume of
960ml, 1728kcal, 78g protein and 698ml free water meeting 96% of estimated
kcal needs and 100% of estimated protein needs.
2. Recommend free water flush 50ml Q4H to provided additional 300ml and grand
total of 998ml free water. RD will adjust water flush according to HD output
and I&O.
3. Recommend Anastacio BID for wound healing
 
Recommendation provided to Dr. Bingham, and Dr. Bingham acknowledged.

## 2020-07-14 NOTE — NUR
PATIENT BEING TRANSFERRED TO ROOM 222B. PATIENT REMOVED FROM ICU MONITOR #8
AND PUT ON TELE. PATIENT TRANSFERRED TO TELE BED WITHOUT INCIDENT. REPORT
CALLED TO ARNOLDO DELATORRE BY DIANE MCLAUGHLIN. QUESTIONS AND CONCERNS ADDRESSED. WIFE DERIK
MADE AWARE OF PATIENT TRANSFER.

## 2020-07-14 NOTE — NUR
Initial Nutrition Assessment: 222T/B JOANNA ARMENDARIZ 54M HR
 
Nursing triggers: appears underweight/malnourished, Tube feeding
Dx: Respiratory failure, pyoglycemia
PMHx: CVA x 4, HTN, ESRD
PSHx: everton able to obtain per H and P
Labs: (7/14) Na 130L, CL 94L, BG 140H, BUN 77H, Cr 3.1H, GFR 22, Alk ph 505H,
WBC 13.6H, H/H 7.9/24L
Meds: Catapres, Decadron, heparin sodium, apresoline, hydrochlorothiazide,
Lopressor, Vancomycin, Prilosec, Zosyn
Diet: TF-nepro at 30 ml/hr fwf 60ml Q6H via GT
PO intake since admission:
Ht: 170.18cm/67in Wt: 60kg/132lbs BMI: 20.7 Bed scale: unknown
IBW: 67.27kg/148lbs %IBW: 89.19%  UBW: unknown
Age: 54
Food Allergies: NKFA
Edema: +3 edema to right arm
Last BM: 7/13 per I & O
Skin: ulcer noted to both hips and sacrum, DTI to both heels
Dada: 11
I and O: (7/14) 816ml, (7/13) 170/200ml = 30ml
Per H and P (7/11), This is a 54-year-old male with PMHx of CVA, bedbound with
baseline GCS of 10, ESRD on dialysis, and recent admission to [facility]
for pneumonia 1 week ago who presented to ED with acute onset respiratory
failure.  Prior to arrival patient was sating at 50% on room air and was
placed on nonrebreather by paramedics with improvement of saturation to 94%.
Patient was also found to be hypoglycemic at 27. According to the wife, he was
fine last night and this morning she found him struggling to breathe. Wife is
usual caregiver and patient is nonverbal at baseline.
Pt was admitted with dx: acute hypoxic respiratory failure, sepsis, CVA/ 4,
central line placement, r/o thrombosis of upper extremity, h/o HTN, Gtube,
Cholecystectomy, h/o DM
 
RD Note (7/14)
Pt was seen awake and lying in bed during visit. Per pt's RN, pt's tube
feeding was infusing at 20ml/hr, and pt was tolerating tube feed with GRV=0ml.
There was no BM and signs of GI distress according RN. Pt was transferred to
RM. 222B later. Pt's current TF regimen at goal rate will provide a volume of
720ml, 1296kcal, 58 g protein, and 523.44ml free water meeting 72% of
estimated kcal needs and 80% of estimated protein needs.
 
Problem with:  N/V/D/C: none per RN
Problems with: Chewing:  Swallowing:  Pt on TF
Current appetite: pt on TF
Recent wt change: unknown %wt change: unknown
Height: unknown
Vitamin/Supplement use: unknown
Special diet at home: unknown
Physical activity: unknown
Nutrition education given (specify specific nutrition education and handout
given): not given at this time
 
Food-drug interactions? Education given? n/a
 
Estimated Nutritional Needs Based on current body weight (60kg)
Energy: 9867-8307 kcal/day (30-35 kcal/kg for ESRD on HD and wound healing)
Protein: 72-84 g/day (1.2-1.4 g/kg for ESRD on HD and wound healing)
Fluid: 1L + HD output or per MD
 
Nutrition Diagnosis:
1. Increased energy and protein needs r/t increased kcal and protein
expenditure and skin integrity a/e/b pt has ESRD on HD, ulcer to sacrum, and
DTI to both heels.
2. Inadequate energy intake r/t insufficient tube feeding infusion a/e/b pt
current regimen meeting 72% of estimated kcal needs.
 
Intervention
1. Recommend Nepro at 40ml/hr. At goal rate, it will provide a volume of
960ml, 1728kcal, 78g protein and 698ml free water meeting 96% of estimated
kcal needs and 100% of estimated protein needs.
2. Recommend free water flush 50ml Q4H to provided additional 300ml and grand
total of 998ml free water. RD will adjust water flush according to HD output
and I&O.
3. Recommend Anastacio BID for wound healing
 
Recommendation provided to Dr. Bingham, and Dr. Bingham acknowledged.
 
Monitor/Evaluate
Goal: Pt meeting at least 75% of estimated needs via nutrition support
Monitor: Nutrition support tolerance, Labs, GI function, Body weight, skin
integrity
F/U in 2-3 days as high risk 7/16-17

## 2020-07-15 VITALS — DIASTOLIC BLOOD PRESSURE: 80 MMHG | SYSTOLIC BLOOD PRESSURE: 139 MMHG

## 2020-07-15 VITALS — SYSTOLIC BLOOD PRESSURE: 151 MMHG | DIASTOLIC BLOOD PRESSURE: 80 MMHG

## 2020-07-15 VITALS — DIASTOLIC BLOOD PRESSURE: 75 MMHG | SYSTOLIC BLOOD PRESSURE: 131 MMHG

## 2020-07-15 VITALS — DIASTOLIC BLOOD PRESSURE: 91 MMHG | SYSTOLIC BLOOD PRESSURE: 160 MMHG

## 2020-07-15 VITALS — SYSTOLIC BLOOD PRESSURE: 141 MMHG | DIASTOLIC BLOOD PRESSURE: 79 MMHG

## 2020-07-15 LAB
BUN SERPL-MCNC: 44 MG/DL (ref 7–18)
CALCIUM SERPL-MCNC: 7.7 MG/DL (ref 8.5–10.1)
CHLORIDE SERPL-SCNC: 101 MMOL/L (ref 98–107)
CO2 SERPL-SCNC: 27.7 MMOL/L (ref 21–32)
CREAT SERPL-MCNC: 1.9 MG/DL (ref 0.7–1.3)
CRP SERPL-MCNC: 9.9 MG/DL (ref ?–0.9)
ERYTHROCYTE [DISTWIDTH] IN BLOOD BY AUTOMATED COUNT: 15.2 % (ref 11.5–14.5)
GFR SERPLBLD BASED ON 1.73 SQ M-ARVRAT: 39 ML/MIN
GLUCOSE SERPL-MCNC: 237 MG/DL (ref 74–106)
MAGNESIUM SERPL-MCNC: 1.9 MG/DL (ref 1.8–2.4)
MONOCYTES NFR BLD: 3 % (ref 0–7)
NEUTS BAND NFR BLD: 2 % (ref 0–10)
NEUTS SEG NFR BLD MANUAL: 90 % (ref 37–75)
PHOSPHATE SERPL-MCNC: 1.7 MG/DL (ref 2.5–4.9)
PLATELET # BLD: 239 X10^3MCL (ref 130–400)
POTASSIUM SERPL-SCNC: 3 MMOL/L (ref 3.5–5.1)
RBC MORPH BLD: NORMAL
SODIUM SERPL-SCNC: 138 MMOL/L (ref 136–145)

## 2020-07-15 PROCEDURE — 5A1D70Z PERFORMANCE OF URINARY FILTRATION, INTERMITTENT, LESS THAN 6 HOURS PER DAY: ICD-10-PCS | Performed by: SURGERY

## 2020-07-15 NOTE — NUR
PATIENT RESTING IN BED COMFORTABLE, OPEN EYES WITH TACTILE STIMULI, TELE #30
SR WITH HR 82 AND O2SAT 100% ROOMAIR NOTED. TOBRAMYCIN IVPB INFUSING AT
50ML/HR, LT SUBCLAVIAN CL INTACT AND PATENT. AWAITING FOR HD. CONT TO MONITOR.

## 2020-07-15 NOTE — NUR
PT RESTING IN BED WITH EYES CLOSED. BREATHING EVEN/UNLABORED ON 2L NC, SPO2
98%.  SUCTIONED WHITE SPUTUM FROM MOUTH AND PROVIDED ORAL CARE. NEPHRO
INFUSING @ 40 ML/HR, NO RESIDUAL. AIR MATTRESS APPLIED. PT REPOSITIONED.
SOILED WITH STOOL, CLEANED AND CHANGED. SACRAL DRESSING SOILED AND REMOVED.
DEEP SACRAL PRESSURE ULCER WITH MOSTLY RED WOUND BED. UNDERMINING. CLEANSED
WITH NS AND PATTED DRY.  NEW DRESSING APPLIED- THERAHONEY DRESSING, GAUZE, AND
OPTIFOAM. CALL LIGHT WITHIN REACH, BED AT LOWEST POSITION.

## 2020-07-15 NOTE — NUR
REC'D CALL FROM DR. ST. MADE AWARE OF PT'S CXR RESULTS TODAY : MILD
INTERVAL WORSENING OF BILATERAL EDEMA/INFILTRATED. ALSO MADE AWARE OF PLAN TO
CHANGE ABX TO TOBRAMYCIN. STATED PLAN FOR HD TODAY THEN AGAIN ON FRIDAY TO PUT
PT BACK ON HIS HD SCHEDULE.

## 2020-07-15 NOTE — NUR
DR. CHRISTIANSEN AWARE RT HIP WOUND RESISTENT TO TOBRAMYCIN AND ADD MEROPENEM 500MG
IV DAILY. ENDORSE CARE TO YL MCLAUGHLIN.

## 2020-07-15 NOTE — NUR
PAGE DR. CHRISTIANSEN RE: RT HIP WOUND CULTURE RESISTANT TO TOBRAMYCIN. SPOKE TO STEVEN
ANSWERING SERVICE LEFT MESSAGE.

## 2020-07-15 NOTE — NUR
RECEIVED PT EYES OPEN AND DOESN'T TRACKS.NON-VERBAL.RESPONSIVE TO TACTILE
STIMULI.DIMINISHED BREATHSOUNDS.NO COUGHING/CONGESTION NOTED.TOLERATING
ROOMAIR @ 100%.GT FEEDING INFUSIGN AND TOLERATING WELL.NO RESIDUAL NOTED.F/C
TO CLOUDY URINE.NO S/S OF PAIN OR DISCOMFORT./80 MMHG,HR 82.ON DROPLET
PRECAUTION FOR + COVID.WILL OBSERVE PROTOCOL.WILL ANTICIPATE ALL NEEDS.WILL
CONTINUE TO MONITOR.

## 2020-07-15 NOTE — NUR
REPOSITION AND TURN, HOB ELEVATED. ORAL CARE PROVIDED. CONT GT FEEDING AT
40ML/HR, RESIDUAL 3ML. LT SUBCLAVIAN CL X3 LUMEN INTACT AND INFUSING TKO. CONT
TO MONITOR.

## 2020-07-15 NOTE — NUR
SEEN BY DR. HADLEY AND CALLED WIFE SCOTT GRACE AND OBTAINED CONSENT FOR
DEBRIDEMENT TOMORROW AND ALSO OBTAINED CONSENT FOR TRANSFUSION 1 UNIT PRBC AND
AGREEABLE.

## 2020-07-15 NOTE — NUR
DR SANTIAGO AND DR SANTILLAN AWARE OF ABG BEING CANCELLED. PT ON 2LPM WITH SAT OF
95% AND NO CHANGES TO INDICATE ABG.

## 2020-07-15 NOTE — NUR
ORAL CARE PROVIDED. PT CLEANSED WITH CHG WIPES AND JORGE WIPES. 40 ML CLOUDY
TAN URINE. DRESSINGS TO CHRISTIANO HIPS CHANGED: CLEANSED WITH NS, PACKED WITH
BETADINE SOAKED GAUZE, AND OPTIFOAM ON TOP. NO SIGNIFICANT CHANGES DURING
SHIFT. PT REMAINS ON 2L NC. CALL LIGHT WITHIN REACH, BED AT LOWEST POSITION.
WILL ENDORSE TO DAY NURSE.

## 2020-07-15 NOTE — NUR
RESUME CARE PT SLEEPING, NO DISTRESS NOTED. TELE SR, HR 87 NOTED. ROOMAIR
O2SAT 98% NOTED. DIALYSIS SHI AWARE HD TODAY. CONT TO MONITOR.

## 2020-07-15 NOTE — NUR
PT RESTING IN BED WITH EYES CLOSED. OPENS EYES TO TACTILE STIMULI. PUPILS
EQUAL AND REACTIVE. UNABLE TO FOLLOW COMMANDS OR MAKE NEEDS KNOWN. TELE 30. NO
S/SX OF CP. BREAHTING EVEN/UNLABORED ON 2L NC, SPO2 95%. ABD SOFT/FLAT. NEPHRO
INFUSING @ 40 ML/HR WITH 50 ML FWF Q4, 60 ML RESIDUAL REPLACED. JORGE WITH
MINIMAL OUTPUT. HD CATH TO R UPPER CHEST CDI, LAST HD 7/14- 2L OUT. R UPPER
ARM SHUNT, FAINT BRUIT/THRILL PRESENT. L UPPER CHEST CENTRAL LINE, DRESSING
CDI. 3 PORTS FLUSHED AND PATENT WITH BLOOD RETURN. GEN WEAKNESS. BUE STIFF
WITH MAHMOOD ROM. BLE PASSIVE ROM. AIR MATTRESS IN PLACE. REPOSITIONING Q2H. HEELS
OFFLOADED WITH RAISERS. SQUARE OPTIFOAM TO CHRISTIANO HEELS. CALL LIGHT WITHIN REACH,
BED AT LOWEST POSITION. WILL CONTINUE TO MONITOR.

## 2020-07-15 NOTE — NUR
RESIDUAL CHECKED PER PROTOCOL: NONE. NEPRO CONTINUES @ 40 ML/HR. ORAL CARE
PROVIDED. SPO2 100% ON 1L NC. NC REMOVED. SPO2 98% ON RA. WILL CONTINUE TO
MONITOR.

## 2020-07-16 VITALS — SYSTOLIC BLOOD PRESSURE: 150 MMHG | DIASTOLIC BLOOD PRESSURE: 81 MMHG

## 2020-07-16 VITALS — SYSTOLIC BLOOD PRESSURE: 146 MMHG | DIASTOLIC BLOOD PRESSURE: 86 MMHG

## 2020-07-16 VITALS — SYSTOLIC BLOOD PRESSURE: 166 MMHG | DIASTOLIC BLOOD PRESSURE: 91 MMHG

## 2020-07-16 VITALS — DIASTOLIC BLOOD PRESSURE: 77 MMHG | SYSTOLIC BLOOD PRESSURE: 142 MMHG

## 2020-07-16 VITALS — SYSTOLIC BLOOD PRESSURE: 148 MMHG | DIASTOLIC BLOOD PRESSURE: 78 MMHG

## 2020-07-16 VITALS — DIASTOLIC BLOOD PRESSURE: 77 MMHG | SYSTOLIC BLOOD PRESSURE: 133 MMHG

## 2020-07-16 VITALS — SYSTOLIC BLOOD PRESSURE: 161 MMHG | DIASTOLIC BLOOD PRESSURE: 83 MMHG

## 2020-07-16 VITALS — SYSTOLIC BLOOD PRESSURE: 151 MMHG | DIASTOLIC BLOOD PRESSURE: 81 MMHG

## 2020-07-16 LAB
BASOPHILS NFR BLD: 0.3 % (ref 0–2)
BUN SERPL-MCNC: 33 MG/DL (ref 7–18)
CALCIUM SERPL-MCNC: 8.1 MG/DL (ref 8.5–10.1)
CHLORIDE SERPL-SCNC: 103 MMOL/L (ref 98–107)
CO2 SERPL-SCNC: 27.5 MMOL/L (ref 21–32)
CREAT SERPL-MCNC: 1.7 MG/DL (ref 0.7–1.3)
CRP SERPL-MCNC: 8.5 MG/DL (ref ?–0.9)
ERYTHROCYTE [DISTWIDTH] IN BLOOD BY AUTOMATED COUNT: 14.5 % (ref 11.5–14.5)
GFR SERPLBLD BASED ON 1.73 SQ M-ARVRAT: 45 ML/MIN
GLUCOSE SERPL-MCNC: 259 MG/DL (ref 74–106)
MAGNESIUM SERPL-MCNC: 1.9 MG/DL (ref 1.8–2.4)
PHOSPHATE SERPL-MCNC: 1.4 MG/DL (ref 2.5–4.9)
PLATELET # BLD: 219 X10^3MCL (ref 130–400)
POTASSIUM SERPL-SCNC: 3.3 MMOL/L (ref 3.5–5.1)
SODIUM SERPL-SCNC: 139 MMOL/L (ref 136–145)

## 2020-07-16 PROCEDURE — 02HV33Z INSERTION OF INFUSION DEVICE INTO SUPERIOR VENA CAVA, PERCUTANEOUS APPROACH: ICD-10-PCS | Performed by: SURGERY

## 2020-07-16 PROCEDURE — 0JBL0ZZ EXCISION OF RIGHT UPPER LEG SUBCUTANEOUS TISSUE AND FASCIA, OPEN APPROACH: ICD-10-PCS | Performed by: SURGERY

## 2020-07-16 PROCEDURE — 0JBQ0ZZ EXCISION OF RIGHT FOOT SUBCUTANEOUS TISSUE AND FASCIA, OPEN APPROACH: ICD-10-PCS | Performed by: SURGERY

## 2020-07-16 PROCEDURE — 0JB70ZZ EXCISION OF BACK SUBCUTANEOUS TISSUE AND FASCIA, OPEN APPROACH: ICD-10-PCS | Performed by: SURGERY

## 2020-07-16 PROCEDURE — 30233N1 TRANSFUSION OF NONAUTOLOGOUS RED BLOOD CELLS INTO PERIPHERAL VEIN, PERCUTANEOUS APPROACH: ICD-10-PCS | Performed by: SURGERY

## 2020-07-16 PROCEDURE — 0JBM0ZZ EXCISION OF LEFT UPPER LEG SUBCUTANEOUS TISSUE AND FASCIA, OPEN APPROACH: ICD-10-PCS | Performed by: SURGERY

## 2020-07-16 NOTE — NUR
BACK FROM OR FOR S/P DEBRIDEMENT ON R HEEL.BOTH HIPS AND SACRAL AREA.DRESSING
INTACT.NO ACTIVE BLEEDING NOTED.VSS.PLACED TELE MONITORING BACK./81
MMHG,HR 78,TEMP 97.0F,O2 SAT @ 99%.WILL CONTINUE TO MONITOR.

## 2020-07-16 NOTE — NUR
MORNING CARE RENDERED,CHLORHEXIDINE WIPES GIVEN.REMAINS NPO.GT
CLAMPED.COMPLETED BLOOD TRANSFUSION AND TOLERATED WELL.ALL NEEDS ANTICIPATED
AND MET.NO ASE NOTED FROM MERREM AND NEBCIN IV ATB.ON DROPLET PRECAUTION FOR +
COVID.GOODHANDWASHING TECHNIQUE OBSERVED.WILL CONTINUE TO MONITOR.

## 2020-07-16 NOTE — NUR
DUE MEDS ADMINISTERED AS ORDERED.CHANGED CENTRAL LINE DRESSING AND FLUSHED ALL
LINES.REPOSITIONED AT THIS TIME.WILL CONTINUE TO MONITOR.

## 2020-07-16 NOTE — NUR
RECEIVED PATIENT FROM [facility].  NOTIFIED THAT PATIENT IS NPO AND TO HAVE
SURGICAL DEBRIDEMENT TODAY. CONSENT OBTAINED FROM PATIENT WIFE. WILL CHECK IN
ON PATIENT DURING AM MEDICATION PASS AS PATIENT IS IN [place] [id number] ISOLATION UNIT.

## 2020-07-16 NOTE — NUR
STARTED 1 UNIT PRBC.PREMEDICATED WITH TYLENOL AND BENADRYL AS
ORDERED.WITNESSED BY ANOTHER NURSE.BLOOD# [id number].VSS./77 MMHG,HR
83,TEMP 97.3F,O2 SAT @ 100%.WILL CONTINUE TO MONITOR.

## 2020-07-17 VITALS — DIASTOLIC BLOOD PRESSURE: 80 MMHG | SYSTOLIC BLOOD PRESSURE: 130 MMHG

## 2020-07-17 VITALS — DIASTOLIC BLOOD PRESSURE: 51 MMHG | SYSTOLIC BLOOD PRESSURE: 73 MMHG

## 2020-07-17 VITALS — DIASTOLIC BLOOD PRESSURE: 84 MMHG | SYSTOLIC BLOOD PRESSURE: 135 MMHG

## 2020-07-17 VITALS — SYSTOLIC BLOOD PRESSURE: 165 MMHG | DIASTOLIC BLOOD PRESSURE: 92 MMHG

## 2020-07-17 VITALS — SYSTOLIC BLOOD PRESSURE: 139 MMHG | DIASTOLIC BLOOD PRESSURE: 80 MMHG

## 2020-07-17 VITALS — DIASTOLIC BLOOD PRESSURE: 31 MMHG | SYSTOLIC BLOOD PRESSURE: 139 MMHG

## 2020-07-17 LAB
BASOPHILS NFR BLD: 0 % (ref 0–2)
BUN SERPL-MCNC: 43 MG/DL (ref 7–18)
CALCIUM SERPL-MCNC: 8 MG/DL (ref 8.5–10.1)
CHLORIDE SERPL-SCNC: 104 MMOL/L (ref 98–107)
CO2 SERPL-SCNC: 30.5 MMOL/L (ref 21–32)
CREAT SERPL-MCNC: 2.1 MG/DL (ref 0.7–1.3)
ERYTHROCYTE [DISTWIDTH] IN BLOOD BY AUTOMATED COUNT: 15.4 % (ref 11.5–14.5)
GFR SERPLBLD BASED ON 1.73 SQ M-ARVRAT: 35 ML/MIN
GLUCOSE SERPL-MCNC: 211 MG/DL (ref 74–106)
MAGNESIUM SERPL-MCNC: 1.9 MG/DL (ref 1.8–2.4)
PHOSPHATE SERPL-MCNC: 2.7 MG/DL (ref 2.5–4.9)
PLATELET # BLD: 241 X10^3MCL (ref 130–400)
POTASSIUM SERPL-SCNC: 3.7 MMOL/L (ref 3.5–5.1)
SODIUM SERPL-SCNC: 141 MMOL/L (ref 136–145)

## 2020-07-17 PROCEDURE — 5A1D70Z PERFORMANCE OF URINARY FILTRATION, INTERMITTENT, LESS THAN 6 HOURS PER DAY: ICD-10-PCS | Performed by: SURGERY

## 2020-07-17 NOTE — NUR
RECEIVED PATIENT AWAKE AND NON VERBAL. PATIENT IS WITH CONTRACTED EXTREMITES
AND IS WITH SWELLING TO THE RIGHT ARM AND HAS A G TUBE FEEDING AND HAS JORGE
TO GRAVITY. PATIENT HAS A [place]ON CATH IN PLACE TO THE CHEST AS WELL AS A
CENTRAL LINE AND BOTH ARE PATENT. PATIENT IS FOR DIALYSIS TODAY AND HE HAS A
PLAN AT SOME POINT TO TRANSFER TO A LTAC FOR CONTINUED ANTIBIOTICS AND WOUND
CARE. PATEINT HAS NOT INDICATED HE HAS PAIN AND DRESSING TO THE WOUND TO THE
SACRAL AND COCCYX INTACT. PATIENT HAD DEBRIDEMENT ND WOUND CARE CONTINUED.
DENISE HAS HEEL PROTECTORS IN PLACE AND HAS BEEN ON MERRIN AND TOBRAMYCIN AND
DECADRON AS ORERED. PATIENT HAS PSEUDOMONAS TO THE WOUND AND BLOOD AND HAS
BEEN COVID POSITVE. REMAINS IN ISOLATION AS INDICATED AND PATIENT HAS NOTED
LABS OF THE CA AT 8.1,. PATIENT HAS VERY DIMINISHED BREATH SOUNDS AND HAS
MINIMAL RESIDUAL WITH FEEDING. JORGE IS CLOUDY URINE. DENISE HAS BEEN ON C
PAP AT NIGHT AND ROOM AIR DURING THE DAY. WILL CONTINUE TO MONITOR AS
INDICATED. VITALS THIS AM /80, 97.6, 78, 18, 100%. AWAITING DIALYSIS AND
POSSIBLE TRANSFER ORDERS .

## 2020-07-17 NOTE — NUR
PT EYES OPEN MOST OF THE NIGHT.BREATHING EASY AND NON-LABORED.TOLERATING
ROOMAIR ALL NIGHT .02 SAT @ 98%-100%.TOLERATED FEEDING ALL NIGHT NEPHRO @ 40
ML/HR.NO RESIDUAL NOTED.REPOSTIONED EVERY 2H.DRESSIGN TO COCCYX BOTH HIPS AND
RIGHT HEEL INTACT.BOOTS TO BOTH HEELS IN PLACED.NO ASE NOTED FROM TOBRAMYCIN
AND MERREM IV ATB.ON DROPLET PRECAUTION FOR COVID+.GOODHANDWASHING TECHNIQUE
OBSERVED.ALL NEEDS MET,WILL CONTINUE TO MONITOR.

## 2020-07-17 NOTE — NUR
WENT TO CHECK PATIENT AS THE DRESSING WAS CHANGED AND APPARENTLY THE 02
MONITOR WAS NOT WORKING THE PATIENT HAD BLOOD FROM THE THIGH TO THE UPPER BACK
AND THE DRESSING JUST CHANGED AND PACKED LIBERALLY WAS SOAKED. PATIENT WAS
THEN CLEANED AND STAFF APPLIED THE SURGISEAL TO THE AREA OF BLEEDING AND THEN
REPACKED THE WOUND WITH FOUR BY FOURS AND THEN ABDS AND THEN OPTIFOAM AND
TAPED TO SECURE. PATIENT TOLERATED WELL BUT CONCERN ABOUT THE PATIENT MAY
CONTINUE TO BLEED. SECURED THE AREA WITH A CHUX AND A MAKE SHIFT DIAPER OF
SORTS TO CONTAIN THE BLOOD IF FURTHER BLEEDING HAPPENED. CALLED THE CHARGE TO
CALL THE RESIDENT FOR ASSISTANCE. THEY WILL SEE THE PATIENT WHEN THEY RETURN
TO THE HOSPITAL. IN THE MEANTIME THE BLEEDING SEEMS TO HAVE SLOWED OR STOPPED
WITH THE INTERVENTION. GAVE REPORT TO ON COMING STAFF AND CHECKED THE PATIENT
AGAIN BEFORE SIGNING OFF. HE TOLERATED WELL.

## 2020-07-17 NOTE — NUR
MORNING CARE RENDERED.BM X1 TO WATERY STOOL.GOOD PERICARE RENDERED.DRESSING TO
COCCYX AND R HIP SOILED.CHANGED DRESSING AND SECURED WITH ISLAND
DERESSING.REPOSITIONED.WILL CONTINUE TO MONITOR.

## 2020-07-17 NOTE — NUR
1. Continue Nepro at 40ml/hr as tolerate. At goal rate, it will provide a
volume of 960ml, 1728kcal, 78g protein and 698ml free water meeting 96% of
estimated kcal needs and 100% of estimated protein needs.
2. Continue Anastacio BID for wound healing

## 2020-07-17 NOTE — NUR
PATIENT SEEN BY THE RESIDENTS AND PLAN OF CARE HAS BEEN DISCUSSED [facility] FAMILY
ON TH EPHONE PRIOR. AWAITING PLACEMENT AND TRANSFER ORDERS.

## 2020-07-17 NOTE — NUR
RECEIVED PT FROM DAYSHIFT NURSE. PT IS AAOX0, NON-VERBAL, DEMONSTRATES NO S/S
OF HEADACHE/DIZZINESS/NAUSEA. PT IS TELE #30, NSR, DENIES CHEST PAIN AT THIS
TIME. PULSES ARE EQUAL AND BILATERAL, PT HAS ELIZABETH EDEMA, RECEIVING HEPARIN SQ
PROHPYLAXIS. PT HAS DIMINISHED LUNG SOUNDS, ON RA, DEMONSTRATES NO S/S OF SOB.
ABDOMEN IS SOFT AND ROUND, NO PAIN UPON PALPATION. NORMOACTIVE X4 QUADRANTS,
LAST BM 07/16, LOOSE. PT HAS JORGE CATHETER IN PLACE AND RECEIVED HEMODIALYSIS
TODAY 07/15 2L OUTPUT. PT IS CONTRACTED BUE/BLE, BEDFAST. PT HAS WOUNDS TO
BILATERAL HIPS, BILATERAL HEELS, AND SACRO-COCCYX AREA. WOUNDS HAVE PACKING IN
PLACE, CDI AT THIS TIME. PT HAS CENTRAL LINE IN PLACE LEFT SUBCLAVIVAL, NO
REDNESS OR SWELLING AT SITE. PT IS CALM AND COOPERATIVE AT THIS TIME. ALL
COMFORT CARE ACCOUNTED FOR. BED IN LOWEST POSITION, CALL LIGHT WITHIN REACH.
WILL CONTINUE TO MONITOR.

## 2020-07-17 NOTE — NUR
Follow-up Nutrition Assessment: 222T/B JOANNA ARMENDARIZ 54M HR
 
Nursing triggers: appears underweight/malnourished, Tube feeding
Dx: Respiratory failure, pyoglycemia
PMHx: CVA x 4, HTN, ESRD
Labs: () BG 259H, BUN 3H, Cr 1.7H, H/H 10/30L,
Meds: Apresoline, Catapres, decadron, hydrochlorothiazide, Lopressor, Merrem,
Nebcin, Prilosec
PRN meds: hydralazine
Diet: Nepro at 40ml/hr.
TF infused: ()360ml, ()1000ml, (7/15) 240ml; avml. not meeting
needs.
Weights: () 60kg *no change since last visit
Edema: edema to upper right extremity
Last BM: 7/15
Skin: ulcer noted to both hips and sacrum, DTI to both heels
Dada: 11
I/Os: () 710/500ml = 210ml, ()2192/300ml = 1892ml, (7/15)290ml,
()816/0ml = 816ml
Per last RD Note (): Pt was seen awake and lying in bed during visit. Per
pt's RN, pt's tube feeding was infusing at 20ml/hr, and pt was tolerating tube
feed with GRV=0ml. There was no BM and signs of GI distress according RN. Pt
was transferred to . 222B later. Pt's current TF regimen at goal rate will
provide a volume of 720ml, 1296kcal, 58 g protein, and 523.44ml free water
meeting 72% of estimated kcal needs and 80% of estimated protein needs.
 
RD Note ():
Per operative report (), pt had skin debridement on  by Dr. Benjamin. Per
pt's primary RN, pt was tolerating his current tube feeding at goal rate
40ml/hr with no residuals. RN reported loose stool possibly d/t medication.
 
Estimated Nutritional Needs Based on current body weight (60kg)
Energy: 9791-8600 kcal/day (30-35 kcal/kg for ESRD on HD and wound healing)
Protein: 72-84 g/day (1.2-1.4 g/kg for ESRD on HD and wound healing)
Fluid: 1L + HD output or per MD
 
Nutrition Diagnosis:
(ongoing)
1. Increased energy and protein needs r/t increased kcal and protein
expenditure and skin integrity a/e/b pt has ESRD on HD, ulcer to sacrum, and
DTI to both heels.
(ongoing, modified)
2. Inadequate energy intake r/t insufficient tube feeding infusion a/e/b
average TF infusion of 533ml from 7/15-.
 
Intervention
1. Continue Nepro at 40ml/hr as tolerate. At goal rate, it will provide a
volume of 960ml, 1728kcal, 78g protein and 698ml free water meeting 96% of
estimated kcal needs and 100% of estimated protein needs.
2. Continue Anastacio BID for wound healing
 
Monitor/Evaluate
Goal: Pt meeting at least 75% of estimated needs via nutrition support
Monitor: Nutrition support tolerance, Labs, GI function, Body weight, skin
integrity
F/U in 2-3 days as high risk -

## 2020-07-17 NOTE — NUR
CHANGED DRESSING TO THE THREE SITES. THE ONE TO THE RIGHT SACRAL WAS BLEEDING
AND PACKED AND PLACED A OPTIFOAM HEART O THE HIP AND PACKED WITH BETADINE
MELLISSA POST CLEANING WITH WOUND CLEANSER. PATENT HAS NO WHINCING OR GRIMACING
WITH THESE ACTIONS AND SEEMS TO HAVE TOLERATED WELL. PATIENT HAD A DARK ALMOST
BLACK STOOL AND VERY LIQUID BUT STICKY. CLEANSED THE AREA AND CAREFUL NOT TO
INTRODUCE ANY STOOL IN THE WOUND SITES. THE WOUND ON THE COCCYX AS NOT AS DEEP
BUT IS STILL SHOWING MUSCLE AND SOME STRANDS OF YELLOW TISSUE. THE LEFT SACRAL
IS ABOUT THE SAME DEPTH BUT THESE ARE NOT BLEEDING AS MUCH AS THE RIGHT
SACRAL. PATIENT IS STATUS POST DEBRIDEMENT. HEEL DRESSING IS CLEAN AND DRY AND
CONTINUED WITH THE BOOTS IN PLACE AND PATIENT MADE COMFORTABLE AND RESTARTED
THE G TUBE FEEDING AS INDICATED. HAD HUNG THE TOBRAMYCIN AND SO FAR NO ADVERSE
REACTIONS NOTED. NO RASHES OR REDNESS NOTED TO HTE FILIBERTO AREA AND PATIENT HAS
MINIMAL URINE OUTPUT WITH 2 LITERS FROM THE DIALYSIS NOTED.

## 2020-07-18 VITALS — SYSTOLIC BLOOD PRESSURE: 93 MMHG | DIASTOLIC BLOOD PRESSURE: 52 MMHG

## 2020-07-18 VITALS — SYSTOLIC BLOOD PRESSURE: 106 MMHG | DIASTOLIC BLOOD PRESSURE: 65 MMHG

## 2020-07-18 VITALS — SYSTOLIC BLOOD PRESSURE: 113 MMHG | DIASTOLIC BLOOD PRESSURE: 75 MMHG

## 2020-07-18 VITALS — DIASTOLIC BLOOD PRESSURE: 52 MMHG | SYSTOLIC BLOOD PRESSURE: 92 MMHG

## 2020-07-18 VITALS — SYSTOLIC BLOOD PRESSURE: 129 MMHG | DIASTOLIC BLOOD PRESSURE: 66 MMHG

## 2020-07-18 VITALS — SYSTOLIC BLOOD PRESSURE: 92 MMHG | DIASTOLIC BLOOD PRESSURE: 52 MMHG

## 2020-07-18 VITALS — DIASTOLIC BLOOD PRESSURE: 56 MMHG | SYSTOLIC BLOOD PRESSURE: 96 MMHG

## 2020-07-18 VITALS — DIASTOLIC BLOOD PRESSURE: 60 MMHG | SYSTOLIC BLOOD PRESSURE: 114 MMHG

## 2020-07-18 VITALS — SYSTOLIC BLOOD PRESSURE: 78 MMHG | DIASTOLIC BLOOD PRESSURE: 47 MMHG

## 2020-07-18 LAB
ALBUMIN SERPL-MCNC: 1.3 G/DL (ref 3.4–5)
ALP SERPL-CCNC: 366 U/L (ref 46–116)
ALT SERPL-CCNC: 12 U/L (ref 16–63)
AST SERPL-CCNC: 18 U/L (ref 15–37)
BASOPHILS NFR BLD: 0 % (ref 0–2)
BASOPHILS NFR BLD: 0 % (ref 0–2)
BILIRUB SERPL-MCNC: 0.2 MG/DL (ref 0.2–1)
BUN SERPL-MCNC: 36 MG/DL (ref 7–18)
BUN SERPL-MCNC: 38 MG/DL (ref 7–18)
CALCIUM SERPL-MCNC: 7.9 MG/DL (ref 8.5–10.1)
CALCIUM SERPL-MCNC: 7.9 MG/DL (ref 8.5–10.1)
CHLORIDE SERPL-SCNC: 103 MMOL/L (ref 98–107)
CHLORIDE SERPL-SCNC: 103 MMOL/L (ref 98–107)
CO2 SERPL-SCNC: 27.5 MMOL/L (ref 21–32)
CO2 SERPL-SCNC: 30.9 MMOL/L (ref 21–32)
CREAT SERPL-MCNC: 1.8 MG/DL (ref 0.7–1.3)
CREAT SERPL-MCNC: 1.8 MG/DL (ref 0.7–1.3)
ERYTHROCYTE [DISTWIDTH] IN BLOOD BY AUTOMATED COUNT: 15.1 % (ref 11.5–14.5)
ERYTHROCYTE [DISTWIDTH] IN BLOOD BY AUTOMATED COUNT: 15.1 % (ref 11.5–14.5)
GFR SERPLBLD BASED ON 1.73 SQ M-ARVRAT: 42 ML/MIN
GFR SERPLBLD BASED ON 1.73 SQ M-ARVRAT: 42 ML/MIN
GLUCOSE SERPL-MCNC: 318 MG/DL (ref 74–106)
GLUCOSE SERPL-MCNC: 329 MG/DL (ref 74–106)
PLATELET # BLD: 246 X10^3MCL (ref 130–400)
PLATELET # BLD: 306 X10^3MCL (ref 130–400)
POTASSIUM SERPL-SCNC: 3.3 MMOL/L (ref 3.5–5.1)
POTASSIUM SERPL-SCNC: 3.7 MMOL/L (ref 3.5–5.1)
PROT SERPL-MCNC: 5.4 G/DL (ref 6.4–8.2)
RBC MORPH BLD: (no result)
RBC MORPH BLD: NORMAL
SODIUM SERPL-SCNC: 138 MMOL/L (ref 136–145)
SODIUM SERPL-SCNC: 139 MMOL/L (ref 136–145)

## 2020-07-18 NOTE — NUR
PER DR. CALVO, BOLUSED PATIENT WITH 500ML NS. NEW /75 IN LOWER LEFT
LEG. PATIENT'S TEMPERATURE STILL REMAINS HIGH 96s, LOW 97s. PATIENT REMAINS
WITH TWO BLANKETS ON AND HEATER ON. ATTEMPTED TO CALL RESIDENT TO INFORM OF
NEW VITAL SIGNS, WILL ATTEMPT TO PAGE THEM AGAIN BEFORE SHIFT ENDS, CHARGE
NURSE IS AWARE, WILL COMMUNICATE WITH DAYSHIFT NURSE AS WELL. PATIENT'S
WOUNDS REMAIN STABLE AT THIS TIME. DID ONE DRESSING CHANGE THROUGHOUT THE
NIGHT, ALERTED PATIENT OF THE ACTIVE BLEEDING THAT WAS OCCURING. RESIDENT SAID
HE WOULD CONTACT DR. HADLEY IN THE MORNING. WOUNDS HAVE BEEN RECHECKED THIS
MORNING, DRESSING REMAINS CDI AT THIS TIME. ALL COMFORT CARE ACCOUNTED FOR AT
THIS TIME. BED IN LOWEST POSITION, CALL LIGHT WITHIN REACH. WILL CONTINUE TO
MONITOR UNTIL APPROPRIATE TO ENDORSE TO DAYSHIFT NURSE.

## 2020-07-18 NOTE — NUR
STARTED THE K RIDER AND THE BLOOD AND HAD GIVEN BENADRYL AND TYLENOL AS
ORDERED. PATIENT IS LYING ON THE AFFECTED SIDE AS DIRRECTED AND WHEN THE
DRESSING WAS CHANGED IT APPEARS THE SURGI STAT HAD FINALLY STOPPED THE
BLEEDING FOR NOW.PATIENT HAD ANOTHER BLOOD DRAW AND WILL CONTINUE TO MONITOR
WITH THE BLOOD INFUSION OF TWO UNITS. NO SATURATION OF THE DRESSING AS WHEN
PATIENT HAD A STICKY DARK ALMOST BLACK STOOL AND DRESSING WERE CHANGED. SO FAR
INTACT. PATIENT TOLERATED THE DRESSING CHANGE WELL. REPLACED THE HEMOSTATIC
DRESSING TO THE AREA THAT HAD BEEN BLEEDING PRIOR AND WITTHE HEPARIN HELD AND
THE PRESSURE TO HTE SITE HOPEFULLY NO FURTHER BLEEDING WILL OCCUR. NOTED THE
HEMAGLOBIN AT 5.4/19. DR IS AWARE AS WELL AS SURGEON. VITALS STABLE AT 97.,
93., 90/47, 20, 97% AND THEN AFTER THE FIRST FIFTEEN AT 97.3, 9, 100/53, 20,
100%.

## 2020-07-18 NOTE — NUR
MD called at this time, reported lump on stomach near the PEG tube placement.
As per MD will order KUB at this time and will come and assess the Pt.
 
RT also called at this time if they can do deep suctioning on pt. Pt. noted to
be coughing up thick phlegm, o2 sat well at % on Oxymizer 8L.
 
Will cont. to monitor.

## 2020-07-18 NOTE — NUR
Pt. wound dressing in tact at this time, only slight amount of BM noted, all
dressing in tact on coccyx region. No acute changes, will cont. to monitor.

## 2020-07-18 NOTE — NUR
RECIEVED PATIENT ON NON REBREATHER FROM LAST NIGHT.PATIENT IS PALER THAN
LAST NIGHT,WHEN STAFF LEFT AND STILL OUZING BLOOD FROM THE RIGHT SACRAL WOUND.
DRESSING CHANGED AND PATIENT HAS DESATURATED  WHILE THE PATIENT WAS LYING ON
HIS SIDE. LUNGS ARE VERY DIMINISHED BUT NO REPORTED GURLING OR
CONGESTION HEARD.PATIENT HAS EDEMA TO THE UPPER EXTREMITIES AND THE EXTREMITIS
ARE COOL TO THE TOUCH. PATIENT HAS JORGE BUT NO SIGNIFICANT OUTPUT NOTED.
CONTINUED ON ISOLATION PROTOCAL FOR COVID ANDTHE HEPARIN WAS HELD DUE TO THE
BLEEDING REPORTED AND STAFF HELD HIS  FEEDING PER REPORT FOR GURGLING TO THE
THROAT. NO RESIDUAL THAT MERITS TO HOLD NOW AND WILL RESTART AS INDICATED.
VITALS AT THIS TIME AT 96.5, 99, 18,113/75, 100% ON NON REBREATHER. NOTED LABS
SHOW NO SIGNIFICANT CHANGE IN HIS BLOOD LEVELS. CONTINUED ON DECADRON.
TOBRAMYCIN AND MERREN. RIGHT QUINTENTO CHEST AND INTACT.

## 2020-07-18 NOTE — NUR
PATIENT IS TOLERATING THE NPO STATUS AND NO SIGNS OF RESP[IRATORY DISTRESS
NOTED. PATIE TISHA BEEN ON OXYMIZER AND REMAIN VERY DIMINISHED. MEDICATIONS
GIVEN THE G TUBE TOLERATED WELL AND THERE IS MINIMAL RESIDUAL OF FLUID RETURN.

## 2020-07-18 NOTE — NUR
PATIENT IS CURRENTLY RESTING IN BED, NO ACUTE DISTRESS NOTED. HAVE BEEN
MONITORING PATIENT'S BP, LATEST BP IS STABLE. I HAVE BEEN CONTINUING TO
MONITOR PATIENT'S WOUND DRAINAGE. ALL COMFORT CARE ACCOUNTED FOR AT THIS TIME.
BED IN LOWEST POSITION, CALL LIGHT WITHIN REACH. WILL CONTINUE TO MONITOR.

## 2020-07-18 NOTE — NUR
Pt. blood tranfusion finished at this time, no acute distress noted. Pt. VS
stable, IV site patent and dressing in tact. Will cont. to monitor.

## 2020-07-18 NOTE — NUR
COMMUNICATED WITH DOCTOR REGARDING PATIENT'S CURRENT STATUS. DOCTOR WILL
COMMUNICATED WITH DR HADLEY REGARDING ACTIVE WOUND BLEEDING. PATIENT'S FEEDING HAS
BEEN PUT ON HOLD FOR NOW DUE TO PATIENT'S GARGLING AND DESATURATION DURING
WOUND CARE. BED OF HEAD HAS BEEN ELEVATED, PATIENT IS SATURATING HIGH
90S-100S. PATIENT HAS TWO HEATED BLANKETS ON, TEMPERATURE WAS 96.8. DOCTOR
WILL ORDER FLUIDS TO HELP IMPROVE BLOOD PRESSURE. ALL COMFORT CARE ACCOUNTED
FOR AT THIS TIME. BED IN LOWEST POSITION, CALL LIGHT WITHIN REACH. WILL
CONTINUE TO MONITOR.

## 2020-07-18 NOTE — NUR
Pt. received from day shift, currently in bed resting. Pt. is a/o x0,
nonverbal, hx of CVA and TIA. Pt. is breathing e/u on 4L via NC, no s/o SOB or
acute distress, on cont. pulse ox and is on tele, NSR, no s/o chest pain. Pt.
noted to have ELIZABETH non-pitting edema, pt. heparin held d/t bleeding risk. Pt.
on GT Tube feeding, held d/t pt. gargling last night shift as per day shift.
Pt. also on HD, on a MWF schedule, last HD was on 7/17, 2L out last episode.
Pt. R Logan CAth, dressing in tact, no s/o active bleeding, pt. on a godwin,
draining clear and yellow. Pt. noted to have contracted BUE/ BLE, and sacral
coccyx and Abraham hips. R noted to have been bleeding last night shift and
stopped during day shift, as per day shift nurse. H/h low, given 2u of blood,
second unit currently infusing at this time. Otherwise, pt. stable, left
subclavical line in tact, will cont. to monitor.
in dressing CDI and intact,

## 2020-07-18 NOTE — NUR
ADVISED DR HADLEY WHEN HE CALLED ABOUT THE PATIETN THAT THE BLEEDING HAS STOPPED.
ADVISED WHAT WAS DONE AND THE LOW HEMAGLOBIN. HE WAS SATIFIED BUT WANTED TO
KNOW WHY HE WAS NOT CALLED. CONTINUED ON FIRST UNIT OF BLOOD AND TOLERATED
WELL SO FAR.

## 2020-07-19 VITALS — DIASTOLIC BLOOD PRESSURE: 80 MMHG | SYSTOLIC BLOOD PRESSURE: 140 MMHG

## 2020-07-19 VITALS — SYSTOLIC BLOOD PRESSURE: 174 MMHG | DIASTOLIC BLOOD PRESSURE: 94 MMHG

## 2020-07-19 VITALS — DIASTOLIC BLOOD PRESSURE: 79 MMHG | SYSTOLIC BLOOD PRESSURE: 144 MMHG

## 2020-07-19 VITALS — SYSTOLIC BLOOD PRESSURE: 95 MMHG | DIASTOLIC BLOOD PRESSURE: 64 MMHG

## 2020-07-19 VITALS — DIASTOLIC BLOOD PRESSURE: 87 MMHG | SYSTOLIC BLOOD PRESSURE: 124 MMHG

## 2020-07-19 LAB
ERYTHROCYTE [DISTWIDTH] IN BLOOD BY AUTOMATED COUNT: 15.1 % (ref 11.5–14.5)
MONOCYTES NFR BLD: 5 % (ref 0–7)
NEUTS BAND NFR BLD: 6 % (ref 0–10)
NEUTS SEG NFR BLD MANUAL: 80 % (ref 37–75)
PLATELET # BLD: 266 X10^3MCL (ref 130–400)
RBC MORPH BLD: (no result)

## 2020-07-19 PROCEDURE — 5A1D70Z PERFORMANCE OF URINARY FILTRATION, INTERMITTENT, LESS THAN 6 HOURS PER DAY: ICD-10-PCS | Performed by: SURGERY

## 2020-07-19 NOTE — NUR
REPORT GIVEN TO EMILY RN. PT REMAINED STABLE THROUGHOUT MY SHIFT. ALL
QUESTIONS AND CONCERNS ADDRESSED. ALL NEEDS MET AT THIS TIME. ALL CARES
ENDORSED TO NIGHT RN. Principal Discharge DX:	 (normal spontaneous vaginal delivery)  Goal:	post partum care  Assessment and plan of treatment:	routine care

## 2020-07-19 NOTE — NUR
RECEIVED CALL FROM DR JANA HADLEY. DR HADLEY STATED OKAY TO RESUME FEEDING AND
MEDICATIONS VIA GTUBE. ALL QUESTIONS AND CONCERNS ADDRESSED ALL NEEDS MET AT
THIS TIME. WILL CONTINUE TO MONITOR PT.

## 2020-07-19 NOTE — NUR
DR LEUNG NOTIFIED ABOUT BEING UNABLE TO GIVE PRILOSEC THROUGH OvulineUBE. DR LEUNG STATED OKAY TO HOLD MEDICATION. ALL QUESTIONS AND CONCERNS ADDRESSED.
ALL NEEDS MET AT THIS TIME. WILL CONTINUE TO MONITOR PT.

## 2020-07-19 NOTE — NUR
SPOKE TO DR SANTILLAN. DR SANTILLAN STATED OKAY TO USE CENTRAL LINE FOR MEDICATIONS.
ALL QUESTIONS AND CONCERNS ADDRESSED. ALL NEEDS MET AT THIS TIME. WILL
CONTINUE TO MONITOR PT.

## 2020-07-19 NOTE — NUR
RECEIVED REPORT FROM SHANELL MCLAUGHLIN. PT AAOX0. PT IS NONVERBAL. TELE #30, NO
INDICATION OF CHEST PAIN. PT ON 6L OXYMIZER. NO SOB OR DISTRESS NOTED. PT HAS
LEFT SUBCLAVICAL CENTRAL LINE, CDI AND PATENT. PT IS STABLE AT THIS TIME W/ NO
DISTRESS NOTED. ALL SAFETY AND COMFORT MEASURES IN PLACE. BED IN LOW POSITION,
2 SIDE RAILS UP. CALL LIGHT WITH IN REACH. ALL QUESTIONS AND CONCERNS
ADDRESSED. ALL NEEDS MET AT THIS TIME. WILL CONTINUE TO MONITOR PT.

## 2020-07-19 NOTE — NUR
DRESSING CHANGE DONE ON PT PER WOUND ORDERS. WOUND DRESSING CDI. PT REMAINED
STABLE THROUGHOUT DRESSING CHANGE. ALL NEEDS MET AT THIS TIME. WILL CONTINUE
TO MONITOR PT.

## 2020-07-19 NOTE — NUR
Pt. throughout the night noted to be resting. RT called to see if suctioning
would be beneficial, RT did some oral suctioning and weaned pt down to 6L via
oxymizer. Pt. showed no s/o acute distress. Dressings in tact, no change
needed. Minimal serosanguineous drainage noted on coccyx region and CHRISTIANO hip
dressing in tact. Otherwise, pt. stable, will give hydralazine IVP at this
time d/t elevated BP this AM. Will cont. to monitor and endorse care to next
shif tRN.

## 2020-07-20 VITALS — SYSTOLIC BLOOD PRESSURE: 122 MMHG | DIASTOLIC BLOOD PRESSURE: 77 MMHG

## 2020-07-20 VITALS — SYSTOLIC BLOOD PRESSURE: 154 MMHG | DIASTOLIC BLOOD PRESSURE: 75 MMHG

## 2020-07-20 VITALS — DIASTOLIC BLOOD PRESSURE: 66 MMHG | SYSTOLIC BLOOD PRESSURE: 144 MMHG

## 2020-07-20 VITALS — DIASTOLIC BLOOD PRESSURE: 98 MMHG | SYSTOLIC BLOOD PRESSURE: 177 MMHG

## 2020-07-20 VITALS — SYSTOLIC BLOOD PRESSURE: 204 MMHG | DIASTOLIC BLOOD PRESSURE: 99 MMHG

## 2020-07-20 VITALS — DIASTOLIC BLOOD PRESSURE: 84 MMHG | SYSTOLIC BLOOD PRESSURE: 163 MMHG

## 2020-07-20 LAB
BASOPHILS NFR BLD: 0 % (ref 0–2)
BUN SERPL-MCNC: 28 MG/DL (ref 7–18)
CALCIUM SERPL-MCNC: 8.1 MG/DL (ref 8.5–10.1)
CHLORIDE SERPL-SCNC: 104 MMOL/L (ref 98–107)
CO2 SERPL-SCNC: 25.9 MMOL/L (ref 21–32)
CREAT SERPL-MCNC: 1.7 MG/DL (ref 0.7–1.3)
ERYTHROCYTE [DISTWIDTH] IN BLOOD BY AUTOMATED COUNT: 15 % (ref 11.5–14.5)
GFR SERPLBLD BASED ON 1.73 SQ M-ARVRAT: 45 ML/MIN
GLUCOSE SERPL-MCNC: 156 MG/DL (ref 74–106)
PLATELET # BLD: 222 X10^3MCL (ref 130–400)
POTASSIUM SERPL-SCNC: 4.4 MMOL/L (ref 3.5–5.1)
SODIUM SERPL-SCNC: 140 MMOL/L (ref 136–145)

## 2020-07-20 NOTE — NUR
Intervention
1. Continue Nepro at 40ml/hr as tolerated. At goal rate, it will provide a
volume of 960ml, 1728kcal, 78g protein and 698ml free water meeting 96% of
estimated kcal needs and 100% of estimated protein needs.
2. Continue Anastacio BID for wound healing

## 2020-07-20 NOTE — NUR
Follow-up Nutrition Assessment: 222T/B JOANNA ARMENDARIZ 54M HR
 
Dx: Respiratory failure, hypoglycemia
PMHx: CVA x 4, HTN, ESRD
Labs: () BG 156H, BUN 28H, Cr 1.7H, H/H 10.8/32L, WBC: 18.9H
Meds: Apresoline, Catapres, decadron, hydrochlorothiazide, Lopressor, Merrem,
Nebcin, Prilosec, Pro-amatine, heparin sodium, hydralazine
Diet: Nepro at 40ml/hr.
TF infused: (): 400 ml ()360ml, ()1000ml, (7/15) 240ml; av
mL (I/O summaries appear incomplete)
Weights: () 60kg *no change since last visit (): 60 kg
Edema:
Last BM: 20
Skin: sacral/coccygeal ulcer with minimal drainage, posterior R/L hip ulcer
with minimal drainage, heel ecchymosis/bruise closed wound, L leg closed wound
, ecchymosis/closed scabs noted to BLE
Dada: 11
I/Os: (-): 100/136 ml= -36 mL  () 710/500ml = 210ml,
()2192/300ml = 1892ml, (7/15)290ml, ()816/0ml = 816ml *note I/O for
- appear incomplete
 
RD Note ():
Per operative report (), pt had skin debridement on  by Dr. Benjamin. Per
pt's primary RN, pt was tolerating his current tube feeding at goal rate
40ml/hr with no residuals. RN reported loose stool possibly d/t medication.
RD note (): per progress note sepsis likely 2/2 aspiration PNA vs
decubitus ulcer infection vs. COVID-19, acute hypoxic respiratory failure 2/2
respiration PNA VS COVID-19, severe protein calorie nutrition, ESRD on HD,
stage 3 decubitus ulcer L hip, stage 4 pressure ulcer sacrum, normocytic
anemia likely 2/2 ESRD
Per pts' nurse, the tube feeding tube is smaller than usual and it is
difficult for her to see if there is any GRV (the syringe will not suction
anything up) but pt is tolerating EN. EN running at goal rate, GRV unknown.
 
Estimated Nutritional Needs Based on current body weight (60kg)
Energy: 9849-4179 kcal/day (30-35 kcal/kg for ESRD on HD and wound healing)
Protein: 72-84 g/day (1.2-1.4 g/kg for ESRD on HD and wound healing)
Fluid: 1L + HD output or per MD
 
Nutrition Diagnosis:
(ongoing)
1. Increased energy and protein needs r/t increased kcal and protein
expenditure and skin integrity a/e/b pt has ESRD on HD, stage 3 decubitus
ulcer L hip, stage 4 pressure ulcer sacrum (ongoing, modified)
2. Inadequate energy intake r/t insufficient tube feeding infusion a/e/b
average TF infusion of 533ml from 7/15- (resolved, nurse reported TF running
at goal rate and no interruptions in TF)
 
Intervention
1. Continue Nepro at 40ml/hr as tolerated. At goal rate, it will provide a
volume of 960ml, 1728kcal, 78g protein and 698ml free water meeting 96% of
estimated kcal needs and 100% of estimated protein needs.
2. Continue Anastacio BID for wound healing
 
Monitor/Evaluate
Goal: Pt meeting at least 75% of estimated needs via nutrition support (met,
ongoing)
Monitor: Nutrition support tolerance, Labs, GI function, Body weight, skin
integrity
F/U in 2-3 days as high risk -

## 2020-07-20 NOTE — NUR
CLEANSED BOTH HEELS, SACRALCOCCYX, RIGHT AND LEFT TROCHANTERS WOUNDS WITH NS,
PAT DRY PACK WITH SOAKED BETADINE AND COVER WITH DRY DRESSING, HEELS PROTECTOR
INPLACED AND KEPT OFFLOADED ON PILLOW. CONTINUE TO TURN AND REPOSITION Q 2HRS.
KEPT HOB ELEVATED AT 30DEG. ON AIRLOSS MATTRESS. TOLERATED TO G TUBE FEEDING
WITH NEPHRO AT 40ML/HR. SIDERAILS UP X2. WILL CONTINUE TO MONITOR.

## 2020-07-20 NOTE — NUR
RECEIVED PT FROM DAY SHIFT NURSE. PT A/OX0. NONVERBAL, HX OF CVA AND T/A. ON
TELE#30 READING SINUS TACHYCARDIA  BPM. EDEMA TO ELIZABETH WITH 1+ PITTING
EDEMA. RR EVEN AND UNLABORED ON 15L OXYMIZER SATING 100%. JORGE CATH IN PLACE
DRAINING YELLOW URINE BY GRAVITY. PT HAS PRESSURE ULCER TO BILATERAL HIPS,
SACRAL COCCYX AREA, AND TO FOOT WITH BOOT ON. DRESSING CHANGE TODAY 7/20/ON
AIR MATTRESS. NO SIGNS OF ACUTE DISTRESS, PAIN, OR SOB NOTED. PT HAS R HUBERT
CATH FOR HD AND LEFT SUBCLAVICAL CENTRAL LINE, PATENT AND INTACT.
DROPLET/CONTACT PRECAUTIONS IN PLACE FOR COVID +. CALL LIGHT WITHIN REACH. BED
IN LOWEST POSITION. WILL CONTINUE TO MONITOR.

## 2020-07-20 NOTE — NUR
DOCTOR SANTILLAN AT [facility] AT BEDSIDE FOR AM ROUND. O2 OXIMIXER SWITCHED TO
NRM PER DOCTOR JACQUE RECCOMMENDATION, O2SAT NOTED 96% AT THIS TIME. NON
VERBAL. AAOX0. HX OF CVA AND TIA. ON TELE# 30 NSR. TUNNELLED CATHETER TO RIGHT
CHEST FOR HD, DRSG CDI, TLC TO LT SUB CLAVIAN ALL PORTS FLUSHABLE,D RSG CDI. G
TUBE FEEDING WITH NEPHRO ONGOING AT 40ML/HR WITH FWF 50ML Q 4HRS. DRSG TO
SACRAL, BOTH HIPS, AND BOTH HEELS, DRY, INTACT. ON AIRLOSS MATTRESS. BLE
OFFLOADED ON HEELS PROTECTORS. ON CONTACT/DROPLET ISOLATION. SIDERAILS UP X2.

## 2020-07-21 NOTE — NUR
SPOKE TO DEPUTY MATHEW CRUZ ([facility]) AT THIS TIME. DEPUTY SAID IT OK TO
RELEASE BODY AND TRANSFER PATIENT TO THE [facility] NO NEED FOR A NUMBER AT THIS
TIME. POST MORTEM CARE RENDERED.

## 2020-07-21 NOTE — NUR
PT SEEN DESATING INTO 70'S/HR DECREASING INTO 40'S. PT ON 15L NRB. RT PAGED
AND ASSESSED PT.  NOTIFIED. PATIENT BAGGED UNTIL TRANSFERED TO ICU. PT
TRANSFERED TO ICU FOR BIPAP. PT MOD CODE BIPAP ONLY.

## 2020-07-21 NOTE — NUR
CALLED [facility] AT THIS TIME, CASE# [id number] SPOKE WITH WHITNEY, WILL NOT BE
A CANDIDATE AT THIS TIME. PRIMARY RN MADE AWARE.

## 2020-07-21 NOTE — NUR
[facility] OFFICE CONTACTED AT THIS TIME, FRANKY REPRESENTATIVE AT THIS TIME
SAID  WILL CALL BACK. PRIMARY RN MADE AWARE.

## 2020-07-21 NOTE — NUR
PT SEEN DESATING INTO 70'S/HR DECREASING INTO 40'S. PT ON
15L NRB. RT PAGED AND ASSESSED PT.  NOTIFIED. PATIENT BAGGED UNTIL
TRANSFERED TO ICU. PT TRANSFERED TO ICU FOR BIPAP AT 0225. PT MOD CODE BIPAP
ONLY.

## 2020-07-21 NOTE — NUR
PT LEFT FOR THE [place] AT THIS TIME VIA [employer], ACCOMPANIED BY SECURITY. PT
HAS NO BELONGING AT THIS TIME.

## 2020-07-21 NOTE — NUR
PT WAS TRANSFERRED IN FROM TELE FLOOR DUE TO DESATING ON NEEDS TO BE ON BIPAP.
PT IS A MODIFIED DNR PER FAMILYS REQUEST AND ONLY NEEDS TO BE ON BIPAP. PT WAS
PLACED IN BED COMFORTABLE AND RIGHT AFTER PT WAS PLACED ON BIPAP, PT INDIRA
DOWN TO 30 THEN TO 20'S, FAMILY WAS CALLED AND NOTIFIED THEM OF PTS CONDITION
AND THEY SAID THEY WILL COME AND SEE THE PT. DECICION AS MODIFIED DNR STILL
THEIR CHOICE AT THIS POINT. AT 0253 PTS WIFE AND DAUGHTER CAME, HR  WENT DOWN
TO 17 TO ASYSTOLE, THOMAS PALOMO WAS CALLED WITH THE FAMILY PRESENT. AT 0300, PT
WAS PRONOUNCED BY ED MD. THEY SPOKE WITH WIFE AND DAUGHTER IN WHICH THEY
VERBALIZED UNDERSTANDING OF THE SITUATION. FAMILY WAS THEN ALLOWED TO ENTER
THE PTS ROOM DONNING PROPER PPE.
